# Patient Record
Sex: FEMALE | Race: WHITE | NOT HISPANIC OR LATINO | Employment: FULL TIME | URBAN - NONMETROPOLITAN AREA
[De-identification: names, ages, dates, MRNs, and addresses within clinical notes are randomized per-mention and may not be internally consistent; named-entity substitution may affect disease eponyms.]

---

## 2018-12-12 ENCOUNTER — HOSPITAL ENCOUNTER (OUTPATIENT)
Dept: PHYSICAL THERAPY | Facility: HOSPITAL | Age: 28
Discharge: HOME | End: 2018-12-12

## 2018-12-12 PROCEDURE — 97799 UNLISTED PHYSCL MED/REHAB PX: CPT

## 2019-01-18 ENCOUNTER — TRANSCRIBE ORDERS (OUTPATIENT)
Dept: ADMINISTRATIVE | Facility: HOSPITAL | Age: 29
End: 2019-01-18

## 2019-01-18 DIAGNOSIS — R10.11 ABDOMINAL PAIN, RIGHT UPPER QUADRANT: Primary | ICD-10-CM

## 2019-01-22 ENCOUNTER — TRANSCRIBE ORDERS (OUTPATIENT)
Dept: LAB | Facility: HOSPITAL | Age: 29
End: 2019-01-22

## 2019-01-22 ENCOUNTER — HOSPITAL ENCOUNTER (OUTPATIENT)
Dept: ULTRASOUND IMAGING | Facility: HOSPITAL | Age: 29
Discharge: HOME | End: 2019-01-22
Admitting: NURSE PRACTITIONER

## 2019-01-22 DIAGNOSIS — R19.01 RIGHT UPPER QUADRANT ABDOMINAL MASS: ICD-10-CM

## 2019-01-22 DIAGNOSIS — R19.01 RIGHT UPPER QUADRANT ABDOMINAL MASS: Primary | ICD-10-CM

## 2019-01-22 PROCEDURE — 76705 ECHO EXAM OF ABDOMEN: CPT

## 2019-01-28 ENCOUNTER — HOSPITAL ENCOUNTER (OUTPATIENT)
Dept: NUCLEAR MEDICINE | Facility: HOSPITAL | Age: 29
End: 2019-01-28

## 2019-02-05 ENCOUNTER — TRANSCRIBE ORDERS (OUTPATIENT)
Dept: ADMINISTRATIVE | Facility: HOSPITAL | Age: 29
End: 2019-02-05

## 2019-02-05 DIAGNOSIS — R10.11 RUQ ABDOMINAL PAIN: Primary | ICD-10-CM

## 2019-02-07 ENCOUNTER — HOSPITAL ENCOUNTER (OUTPATIENT)
Dept: MRI IMAGING | Facility: HOSPITAL | Age: 29
Discharge: HOME | End: 2019-02-07
Admitting: RADIOLOGY

## 2019-02-07 ENCOUNTER — APPOINTMENT (OUTPATIENT)
Dept: LAB | Facility: HOSPITAL | Age: 29
End: 2019-02-07

## 2019-02-07 DIAGNOSIS — R10.11 RUQ ABDOMINAL PAIN: ICD-10-CM

## 2019-02-07 LAB
CREAT BLDA-MCNC: 0.6 MG/DL (ref 0.6–1.3)
HCG INTACT+B SERPL-ACNC: <2.39 MIU/ML (ref 0–5)

## 2019-02-07 PROCEDURE — 84702 CHORIONIC GONADOTROPIN TEST: CPT | Performed by: RADIOLOGY

## 2019-02-07 PROCEDURE — 74183 MRI ABD W/O CNTR FLWD CNTR: CPT

## 2019-02-07 PROCEDURE — 82565 ASSAY OF CREATININE: CPT

## 2019-02-07 PROCEDURE — A9577 INJ MULTIHANCE: HCPCS | Performed by: NURSE PRACTITIONER

## 2019-02-07 PROCEDURE — 0 GADOBENATE DIMEGLUMINE 529 MG/ML SOLUTION: Performed by: NURSE PRACTITIONER

## 2019-02-07 RX ADMIN — GADOBENATE DIMEGLUMINE 14 ML: 529 INJECTION, SOLUTION INTRAVENOUS at 08:50

## 2019-02-19 ENCOUNTER — APPOINTMENT (OUTPATIENT)
Dept: NUCLEAR MEDICINE | Facility: HOSPITAL | Age: 29
End: 2019-02-19

## 2020-05-14 ENCOUNTER — OFFICE VISIT (OUTPATIENT)
Dept: OBSTETRICS AND GYNECOLOGY | Facility: CLINIC | Age: 30
End: 2020-05-14

## 2020-05-14 VITALS
HEIGHT: 63 IN | BODY MASS INDEX: 25.16 KG/M2 | DIASTOLIC BLOOD PRESSURE: 78 MMHG | WEIGHT: 142 LBS | SYSTOLIC BLOOD PRESSURE: 100 MMHG

## 2020-05-14 DIAGNOSIS — Z12.4 SCREENING FOR CERVICAL CANCER: ICD-10-CM

## 2020-05-14 DIAGNOSIS — Z01.419 ENCOUNTER FOR GYNECOLOGICAL EXAMINATION WITHOUT ABNORMAL FINDING: Primary | ICD-10-CM

## 2020-05-14 PROCEDURE — 99385 PREV VISIT NEW AGE 18-39: CPT | Performed by: OBSTETRICS & GYNECOLOGY

## 2020-05-14 PROCEDURE — 87624 HPV HI-RISK TYP POOLED RSLT: CPT | Performed by: OBSTETRICS & GYNECOLOGY

## 2020-05-14 RX ORDER — PRENATAL VIT NO.126/IRON/FOLIC 28MG-0.8MG
TABLET ORAL DAILY
COMMUNITY
End: 2022-02-08

## 2020-05-14 RX ORDER — PROPRANOLOL HYDROCHLORIDE 10 MG/1
10 TABLET ORAL AS NEEDED
COMMUNITY
End: 2023-02-03

## 2020-05-14 NOTE — PROGRESS NOTES
"CC: annual exam    SUBJECTIVE: Dania Cardoso is a 29 y.o. female , para 1, who comes to the office today for annual GYN examination. Last menstrual period was 2 weeks ago and her last Pap smear was 3 years ago, and was normal. She has no history of cervical dysplasia. She is currently not on contraception and is hoping to get pregnant. She is not taking a prenatal vitamin. Her medical history is reviewed. Her partner is currently on chemotherapy for a brain tumor, but is hoping to finish soon.    HPI      Social History     Tobacco Use   • Smoking status: Never Smoker   • Smokeless tobacco: Never Used   Substance Use Topics   • Alcohol use: Never     Frequency: Never   • Drug use: Never        Review of Systems   Constitutional: Negative for fever.   Respiratory: Negative for cough.    Gastrointestinal: Negative for abdominal distention.   Genitourinary: Negative for dyspareunia.   Hematological: Does not bruise/bleed easily.       Visit Vitals  /78 (BP Location: Left arm, Patient Position: Sitting)   Ht 160 cm (63\")   Wt 64.4 kg (142 lb)   LMP 05/01/2020 (Approximate)   BMI 25.15 kg/m²      Objective   Physical Exam   Constitutional: She is oriented to person, place, and time. She appears well-developed and well-nourished. No distress.   HENT:   Head: Normocephalic and atraumatic.   Eyes: EOM are normal.   Neck: Normal range of motion. Neck supple.   Cardiovascular: Normal rate and regular rhythm.   No murmur heard.  Pulmonary/Chest: Effort normal and breath sounds normal. Right breast exhibits no inverted nipple and no mass. Left breast exhibits no inverted nipple and no mass. No breast tenderness or discharge.   Abdominal: Soft. She exhibits no distension. There is no tenderness.   Genitourinary: Vagina normal and uterus normal. No breast tenderness or discharge. Pelvic exam was performed with patient supine. There is no tenderness or lesion on the right labia. There is no tenderness or lesion on the left " labia. Cervix exhibits no motion tenderness, no discharge and no friability. Right adnexum displays no tenderness and no fullness. Left adnexum displays no tenderness and no fullness. No tenderness or bleeding in the vagina. No vaginal discharge found.   Genitourinary Comments: A Pap smear was performed   Musculoskeletal: Normal range of motion. She exhibits no edema.   Neurological: She is alert and oriented to person, place, and time.   Skin: Skin is warm and dry.   Psychiatric: She has a normal mood and affect. Her behavior is normal. Judgment normal.   Nursing note and vitals reviewed.    Assessment/Plan   Dania was seen today for infertility and gynecologic exam.    Diagnoses and all orders for this visit:    Encounter for gynecological examination without abnormal finding    Screening for cervical cancer  -     Liquid-based Pap Smear, Screening      She will start a prenatal vitamin, and he will return for a semen analysis once he completes chemotherapy.  We will notify her when the Pap smear results are available. We have discussed current Pap smear screening guidelines.  She will return in one year. In the meantime if she develops questions or problems, she will notify the office.

## 2020-05-18 LAB
GEN CATEG CVX/VAG CYTO-IMP: NORMAL
HPV I/H RISK 4 DNA CVX QL PROBE+SIG AMP: NOT DETECTED
LAB AP CASE REPORT: NORMAL
LAB AP GYN ADDITIONAL INFORMATION: NORMAL
LAB AP GYN OTHER FINDINGS: NORMAL
PATH INTERP SPEC-IMP: NORMAL
STAT OF ADQ CVX/VAG CYTO-IMP: NORMAL

## 2020-09-14 ENCOUNTER — HOSPITAL ENCOUNTER (EMERGENCY)
Facility: HOSPITAL | Age: 30
Discharge: HOME | End: 2020-09-15
Admitting: INTERNAL MEDICINE

## 2020-09-14 DIAGNOSIS — K52.9 GASTROENTERITIS: Primary | ICD-10-CM

## 2020-09-14 LAB
ALBUMIN SERPL-MCNC: 5 G/DL (ref 3.5–5.2)
ALBUMIN/GLOB SERPL: 2.2 G/DL
ALP SERPL-CCNC: 58 U/L (ref 39–117)
ALT SERPL W P-5'-P-CCNC: 14 U/L (ref 1–33)
ANION GAP SERPL CALCULATED.3IONS-SCNC: 12 MMOL/L (ref 5–15)
AST SERPL-CCNC: 16 U/L (ref 1–32)
B-HCG UR QL: NEGATIVE
BACTERIA UR QL AUTO: ABNORMAL /HPF
BASOPHILS # BLD AUTO: 0.09 10*3/MM3 (ref 0–0.2)
BASOPHILS NFR BLD AUTO: 1.1 % (ref 0–1.5)
BILIRUB SERPL-MCNC: 0.3 MG/DL (ref 0–1.2)
BILIRUB UR QL STRIP: NEGATIVE
BUN SERPL-MCNC: 15 MG/DL (ref 6–20)
BUN/CREAT SERPL: 30 (ref 7–25)
CALCIUM SPEC-SCNC: 9.7 MG/DL (ref 8.6–10.5)
CHLORIDE SERPL-SCNC: 104 MMOL/L (ref 98–107)
CLARITY UR: CLEAR
CO2 SERPL-SCNC: 26 MMOL/L (ref 22–29)
COLOR UR: YELLOW
CREAT SERPL-MCNC: 0.5 MG/DL (ref 0.57–1)
DEPRECATED RDW RBC AUTO: 42.3 FL (ref 37–54)
EOSINOPHIL # BLD AUTO: 0.25 10*3/MM3 (ref 0–0.4)
EOSINOPHIL NFR BLD AUTO: 2.9 % (ref 0.3–6.2)
ERYTHROCYTE [DISTWIDTH] IN BLOOD BY AUTOMATED COUNT: 12.6 % (ref 12.3–15.4)
GFR SERPL CREATININE-BSD FRML MDRD: 144 ML/MIN/1.73
GLOBULIN UR ELPH-MCNC: 2.3 GM/DL
GLUCOSE SERPL-MCNC: 95 MG/DL (ref 65–99)
GLUCOSE UR STRIP-MCNC: NEGATIVE MG/DL
HCT VFR BLD AUTO: 40.9 % (ref 34–46.6)
HGB BLD-MCNC: 13.6 G/DL (ref 12–15.9)
HGB UR QL STRIP.AUTO: NEGATIVE
HYALINE CASTS UR QL AUTO: ABNORMAL /LPF
IMM GRANULOCYTES # BLD AUTO: 0.02 10*3/MM3 (ref 0–0.05)
IMM GRANULOCYTES NFR BLD AUTO: 0.2 % (ref 0–0.5)
INTERNAL NEGATIVE CONTROL: NEGATIVE
INTERNAL POSITIVE CONTROL: POSITIVE
KETONES UR QL STRIP: NEGATIVE
LEUKOCYTE ESTERASE UR QL STRIP.AUTO: ABNORMAL
LIPASE SERPL-CCNC: 44 U/L (ref 13–60)
LYMPHOCYTES # BLD AUTO: 3.47 10*3/MM3 (ref 0.7–3.1)
LYMPHOCYTES NFR BLD AUTO: 40.9 % (ref 19.6–45.3)
Lab: NORMAL
MCH RBC QN AUTO: 30.4 PG (ref 26.6–33)
MCHC RBC AUTO-ENTMCNC: 33.3 G/DL (ref 31.5–35.7)
MCV RBC AUTO: 91.5 FL (ref 79–97)
MONOCYTES # BLD AUTO: 0.45 10*3/MM3 (ref 0.1–0.9)
MONOCYTES NFR BLD AUTO: 5.3 % (ref 5–12)
NEUTROPHILS NFR BLD AUTO: 4.21 10*3/MM3 (ref 1.7–7)
NEUTROPHILS NFR BLD AUTO: 49.6 % (ref 42.7–76)
NITRITE UR QL STRIP: NEGATIVE
NRBC BLD AUTO-RTO: 0 /100 WBC (ref 0–0.2)
PH UR STRIP.AUTO: 6.5 [PH] (ref 5–8)
PLATELET # BLD AUTO: 220 10*3/MM3 (ref 140–450)
PMV BLD AUTO: 11.6 FL (ref 6–12)
POTASSIUM SERPL-SCNC: 3.6 MMOL/L (ref 3.5–5.2)
PROT SERPL-MCNC: 7.3 G/DL (ref 6–8.5)
PROT UR QL STRIP: NEGATIVE
RBC # BLD AUTO: 4.47 10*6/MM3 (ref 3.77–5.28)
RBC # UR: ABNORMAL /HPF
REF LAB TEST METHOD: ABNORMAL
SODIUM SERPL-SCNC: 142 MMOL/L (ref 136–145)
SP GR UR STRIP: 1.02 (ref 1–1.03)
SQUAMOUS #/AREA URNS HPF: ABNORMAL /HPF
UROBILINOGEN UR QL STRIP: ABNORMAL
WBC # BLD AUTO: 8.49 10*3/MM3 (ref 3.4–10.8)
WBC UR QL AUTO: ABNORMAL /HPF

## 2020-09-14 PROCEDURE — 84484 ASSAY OF TROPONIN QUANT: CPT | Performed by: NURSE PRACTITIONER

## 2020-09-14 PROCEDURE — 80053 COMPREHEN METABOLIC PANEL: CPT

## 2020-09-14 PROCEDURE — 85025 COMPLETE CBC W/AUTO DIFF WBC: CPT

## 2020-09-14 PROCEDURE — 81025 URINE PREGNANCY TEST: CPT

## 2020-09-14 PROCEDURE — 83690 ASSAY OF LIPASE: CPT

## 2020-09-14 PROCEDURE — 85379 FIBRIN DEGRADATION QUANT: CPT | Performed by: NURSE PRACTITIONER

## 2020-09-14 PROCEDURE — 99283 EMERGENCY DEPT VISIT LOW MDM: CPT

## 2020-09-14 PROCEDURE — 81001 URINALYSIS AUTO W/SCOPE: CPT

## 2020-09-14 RX ORDER — SODIUM CHLORIDE 0.9 % (FLUSH) 0.9 %
10 SYRINGE (ML) INJECTION AS NEEDED
Status: DISCONTINUED | OUTPATIENT
Start: 2020-09-14 | End: 2020-09-15 | Stop reason: HOSPADM

## 2020-09-15 ENCOUNTER — APPOINTMENT (OUTPATIENT)
Dept: CT IMAGING | Facility: HOSPITAL | Age: 30
End: 2020-09-15

## 2020-09-15 VITALS
OXYGEN SATURATION: 99 % | HEART RATE: 61 BPM | BODY MASS INDEX: 26.22 KG/M2 | RESPIRATION RATE: 18 BRPM | DIASTOLIC BLOOD PRESSURE: 73 MMHG | TEMPERATURE: 98.2 F | SYSTOLIC BLOOD PRESSURE: 121 MMHG | HEIGHT: 63 IN | WEIGHT: 148 LBS

## 2020-09-15 LAB
D DIMER PPP FEU-MCNC: 0.24 MG/L (FEU) (ref 0–0.5)
HOLD SPECIMEN: NORMAL
HOLD SPECIMEN: NORMAL
TROPONIN T SERPL-MCNC: <0.01 NG/ML (ref 0–0.03)
WHOLE BLOOD HOLD SPECIMEN: NORMAL
WHOLE BLOOD HOLD SPECIMEN: NORMAL

## 2020-09-15 PROCEDURE — 25010000002 IOPAMIDOL 61 % SOLUTION: Performed by: NURSE PRACTITIONER

## 2020-09-15 PROCEDURE — 74177 CT ABD & PELVIS W/CONTRAST: CPT

## 2020-09-15 PROCEDURE — 93005 ELECTROCARDIOGRAM TRACING: CPT | Performed by: NURSE PRACTITIONER

## 2020-09-15 PROCEDURE — 93010 ELECTROCARDIOGRAM REPORT: CPT | Performed by: INTERNAL MEDICINE

## 2020-09-15 RX ORDER — ONDANSETRON 4 MG/1
4 TABLET, ORALLY DISINTEGRATING ORAL EVERY 6 HOURS PRN
Qty: 8 TABLET | Refills: 0 | Status: SHIPPED | OUTPATIENT
Start: 2020-09-15 | End: 2020-09-17

## 2020-09-15 RX ADMIN — IOPAMIDOL 100 ML: 612 INJECTION, SOLUTION INTRAVENOUS at 01:30

## 2020-09-15 NOTE — ED PROVIDER NOTES
Subjective   Patient is a 30-year-old female that presents here today with complaint of chest and right upper quadrant abdominal pain.  The patient reports that her symptoms began earlier this evening after eating supper.  The patient states that she began to have right upper quadrant pain that radiated to her right upper chest.  She states it was hard for her to take a deep breath with this occurred.  She states that the pain has been intermittent since that time.  She denies any pain at this time.  She is concerned that it could be her gallbladder.  The patient presents here today for further evaluation.  States that she was nauseated with the pain earlier however this is resolved.      History provided by:  Patient   used: No    Abdominal Pain  Pain location:  RUQ  Pain quality: sharp and stabbing    Pain radiates to:  Chest  Pain severity:  Moderate  Onset quality:  Sudden  Duration:  1 day  Timing:  Constant  Progression:  Worsening  Chronicity:  New  Context: not alcohol use, not awakening from sleep, not diet changes, not eating, not laxative use, not medication withdrawal, not previous surgeries, not recent illness, not recent sexual activity, not recent travel, not retching, not sick contacts, not suspicious food intake and not trauma    Relieved by:  Nothing  Worsened by:  Nothing  Ineffective treatments:  None tried  Associated symptoms: chest pain and nausea    Associated symptoms: no anorexia, no belching, no chills, no constipation, no cough, no diarrhea, no dysuria, no fatigue, no fever, no flatus, no hematemesis, no hematochezia, no hematuria, no melena, no shortness of breath, no sore throat, no vaginal bleeding, no vaginal discharge and no vomiting    Risk factors: no alcohol abuse, no aspirin use, not elderly, has not had multiple surgeries, no NSAID use, not obese, not pregnant and no recent hospitalization        Review of Systems   Constitutional: Negative for chills,  fatigue and fever.   HENT: Negative for sore throat.    Respiratory: Negative for cough and shortness of breath.    Cardiovascular: Positive for chest pain.   Gastrointestinal: Positive for abdominal pain and nausea. Negative for anorexia, constipation, diarrhea, flatus, hematemesis, hematochezia, melena and vomiting.   Genitourinary: Negative for dysuria, hematuria, vaginal bleeding and vaginal discharge.   All other systems reviewed and are negative.      Past Medical History:   Diagnosis Date   • Anxiety        Allergies   Allergen Reactions   • Phenergan [Promethazine Hcl] Nausea And Vomiting   • Wasp Venom Swelling and Other (See Comments)     migraines       Past Surgical History:   Procedure Laterality Date   • WISDOM TOOTH EXTRACTION         History reviewed. No pertinent family history.    Social History     Socioeconomic History   • Marital status:      Spouse name: Not on file   • Number of children: Not on file   • Years of education: Not on file   • Highest education level: Not on file   Tobacco Use   • Smoking status: Never Smoker   • Smokeless tobacco: Never Used   Substance and Sexual Activity   • Alcohol use: Never     Frequency: Never   • Drug use: Never   • Sexual activity: Yes     Partners: Male     Birth control/protection: None           Objective   Physical Exam  Vitals signs and nursing note reviewed.   Constitutional:       Appearance: Normal appearance.   HENT:      Head: Normocephalic and atraumatic.   Eyes:      Conjunctiva/sclera: Conjunctivae normal.   Cardiovascular:      Rate and Rhythm: Normal rate and regular rhythm.   Pulmonary:      Effort: Pulmonary effort is normal.      Breath sounds: Normal breath sounds.   Abdominal:      General: Bowel sounds are normal.      Tenderness: There is abdominal tenderness in the right lower quadrant. There is right CVA tenderness.   Skin:     General: Skin is warm and dry.      Capillary Refill: Capillary refill takes less than 2 seconds.    Neurological:      General: No focal deficit present.      Mental Status: She is alert.   Psychiatric:         Mood and Affect: Mood normal.         Procedures           ED Course  ED Course as of Sep 19 0018   Sat Sep 19, 2020   0017 Patient's labs showed a negative troponin.  Lipase was normal.  White blood cell count was normal.  EG showed no acute findings.  CT scan of the abdomen and pelvis showed no acute findings.  At this time patient be discharged home in stable condition.  She advised to follow the primary care provider 1 to 2 days for recheck.    [LF]   0017 She will given a prescription for Zofran.  Advised her to discuss possibly having an outpatient gallbladder ultrasound ordered for further evaluation of her symptoms.  She be discharged home at this time stable condition.    [LF]      ED Course User Index  [LF] Pat Rosales, APRN                                   CT Abdomen Pelvis With Contrast   Final Result   1. No acute inflammatory process seen within the abdomen or pelvis.   2. Similar hypodense liver lesions favorable for cysts and/or   hemangiomas.   3. Hypodense endometrium of the uterus likely related to secretory phase   of menstruation. Follicles suspected within the ovaries.       Comments: A preliminary report is issued to the ER by the [facility]   radiology service. I agree with this preliminary impression.   This report was finalized on 09/15/2020 07:49 by Dr. Emilie Barahona MD.        Labs Reviewed   COMPREHENSIVE METABOLIC PANEL - Abnormal; Notable for the following components:       Result Value    Creatinine 0.50 (*)     BUN/Creatinine Ratio 30.0 (*)     All other components within normal limits    Narrative:     GFR Normal >60  Chronic Kidney Disease <60  Kidney Failure <15     URINALYSIS W/ MICROSCOPIC IF INDICATED (NO CULTURE) - Abnormal; Notable for the following components:    Leuk Esterase, UA Trace (*)     All other components within normal limits   CBC WITH AUTO  DIFFERENTIAL - Abnormal; Notable for the following components:    Lymphocytes, Absolute 3.47 (*)     All other components within normal limits   URINALYSIS, MICROSCOPIC ONLY - Abnormal; Notable for the following components:    RBC, UA 3-5 (*)     WBC, UA 3-5 (*)     Bacteria, UA 1+ (*)     Squamous Epithelial Cells, UA 3-6 (*)     All other components within normal limits   LIPASE - Normal   TROPONIN (IN-HOUSE) - Normal    Narrative:     Troponin T Reference Range:  <= 0.03 ng/mL-   Negative for AMI  >0.03 ng/mL-     Abnormal for myocardial necrosis.  Clinicians would have to utilize clinical acumen, EKG, Troponin and serial changes to determine if it is an Acute Myocardial Infarction or myocardial injury due to an underlying chronic condition.       Results may be falsely decreased if patient taking Biotin.     D-DIMER, QUANTITATIVE - Normal    Narrative:     Reference Range is 0-0.50 mg/L FEU. However, results <0.50 mg/L FEU tends to rule out DVT or PE. Results >0.50 mg/L FEU are not useful in predicting absence or presence of DVT or PE.     POCT PEFORM URINE PREGNANCY - Normal   RAINBOW DRAW    Narrative:     The following orders were created for panel order [facility] Draw.  Procedure                               Abnormality         Status                     ---------                               -----------         ------                     Light Blue Top[[id number]]                                   Final result               Green Top (Gel)[[id number]]                                  Final result               Lavender Top[[id number]]                                     Final result               Red Top[[id number]]                                          Final result                 Please view results for these tests on the individual orders.   CBC AND DIFFERENTIAL    Narrative:     The following orders were created for panel order CBC & Differential.  Procedure                               Abnormality          Status                     ---------                               -----------         ------                     CBC Auto Differential[[id number]]        Abnormal            Final result                 Please view results for these tests on the individual orders.   LIGHT BLUE TOP   GREEN TOP   LAVENDER TOP   RED TOP               MDM  Number of Diagnoses or Management Options  Gastroenteritis: new and requires workup     Amount and/or Complexity of Data Reviewed  Clinical lab tests: reviewed and ordered  Tests in the radiology section of CPT®: ordered and reviewed  Discuss the patient with other providers: yes (Dr. Suresh)    Patient Progress  Patient progress: stable      Final diagnoses:   Gastroenteritis            Pat Rosales, APRN  09/19/20 0018

## 2020-09-21 ENCOUNTER — TRANSCRIBE ORDERS (OUTPATIENT)
Dept: ADMINISTRATIVE | Facility: HOSPITAL | Age: 30
End: 2020-09-21

## 2020-09-21 ENCOUNTER — HOSPITAL ENCOUNTER (OUTPATIENT)
Dept: ULTRASOUND IMAGING | Facility: HOSPITAL | Age: 30
Discharge: HOME | End: 2020-09-21
Admitting: NURSE PRACTITIONER

## 2020-09-21 DIAGNOSIS — R11.0 NAUSEA: ICD-10-CM

## 2020-09-21 DIAGNOSIS — R10.11 RIGHT UPPER QUADRANT ABDOMINAL PAIN: Primary | ICD-10-CM

## 2020-09-21 PROCEDURE — 76705 ECHO EXAM OF ABDOMEN: CPT

## 2020-09-30 ENCOUNTER — APPOINTMENT (OUTPATIENT)
Dept: PREADMISSION TESTING | Facility: HOSPITAL | Age: 30
End: 2020-09-30

## 2020-09-30 ENCOUNTER — LAB (OUTPATIENT)
Dept: LAB | Facility: HOSPITAL | Age: 30
End: 2020-09-30

## 2020-09-30 ENCOUNTER — TRANSCRIBE ORDERS (OUTPATIENT)
Dept: ADMINISTRATIVE | Facility: HOSPITAL | Age: 30
End: 2020-09-30

## 2020-09-30 VITALS
SYSTOLIC BLOOD PRESSURE: 119 MMHG | HEIGHT: 64 IN | BODY MASS INDEX: 24.84 KG/M2 | DIASTOLIC BLOOD PRESSURE: 79 MMHG | HEART RATE: 77 BPM | OXYGEN SATURATION: 99 % | RESPIRATION RATE: 16 BRPM | WEIGHT: 145.5 LBS

## 2020-09-30 DIAGNOSIS — Z01.818 PREOP TESTING: Primary | ICD-10-CM

## 2020-09-30 PROCEDURE — C9803 HOPD COVID-19 SPEC COLLECT: HCPCS | Performed by: SPECIALIST

## 2020-09-30 PROCEDURE — 87635 SARS-COV-2 COVID-19 AMP PRB: CPT | Performed by: SPECIALIST

## 2020-09-30 RX ORDER — DOCUSATE SODIUM 100 MG/1
100 CAPSULE, LIQUID FILLED ORAL 2 TIMES DAILY
COMMUNITY
End: 2023-02-03

## 2020-10-01 ENCOUNTER — ANESTHESIA EVENT (OUTPATIENT)
Dept: PERIOP | Facility: HOSPITAL | Age: 30
End: 2020-10-01

## 2020-10-01 ENCOUNTER — ANESTHESIA (OUTPATIENT)
Dept: PERIOP | Facility: HOSPITAL | Age: 30
End: 2020-10-01

## 2020-10-01 ENCOUNTER — HOSPITAL ENCOUNTER (OUTPATIENT)
Facility: HOSPITAL | Age: 30
Setting detail: HOSPITAL OUTPATIENT SURGERY
Discharge: HOME | End: 2020-10-01
Attending: SPECIALIST | Admitting: SPECIALIST

## 2020-10-01 VITALS
HEART RATE: 66 BPM | RESPIRATION RATE: 14 BRPM | SYSTOLIC BLOOD PRESSURE: 115 MMHG | DIASTOLIC BLOOD PRESSURE: 75 MMHG | OXYGEN SATURATION: 97 % | TEMPERATURE: 97.3 F

## 2020-10-01 DIAGNOSIS — K82.8 OTHER SPECIFIED DISEASES OF GALLBLADDER: ICD-10-CM

## 2020-10-01 LAB
B-HCG UR QL: NEGATIVE
SARS-COV-2 N GENE RESP QL NAA+PROBE: NOT DETECTED

## 2020-10-01 PROCEDURE — 25010000002 ONDANSETRON PER 1 MG: Performed by: ANESTHESIOLOGY

## 2020-10-01 PROCEDURE — 25010000002 ONDANSETRON PER 1 MG: Performed by: NURSE ANESTHETIST, CERTIFIED REGISTERED

## 2020-10-01 PROCEDURE — 25010000002 DEXAMETHASONE PER 1 MG: Performed by: ANESTHESIOLOGY

## 2020-10-01 PROCEDURE — 25010000002 PROPOFOL 10 MG/ML EMULSION: Performed by: NURSE ANESTHETIST, CERTIFIED REGISTERED

## 2020-10-01 PROCEDURE — 25010000002 MIDAZOLAM PER 1 MG: Performed by: ANESTHESIOLOGY

## 2020-10-01 PROCEDURE — 88304 TISSUE EXAM BY PATHOLOGIST: CPT | Performed by: SPECIALIST

## 2020-10-01 PROCEDURE — 81025 URINE PREGNANCY TEST: CPT | Performed by: SPECIALIST

## 2020-10-01 RX ORDER — IBUPROFEN 600 MG/1
600 TABLET ORAL ONCE AS NEEDED
Status: COMPLETED | OUTPATIENT
Start: 2020-10-01 | End: 2020-10-01

## 2020-10-01 RX ORDER — SODIUM CHLORIDE 9 MG/ML
INJECTION, SOLUTION INTRAVENOUS AS NEEDED
Status: DISCONTINUED | OUTPATIENT
Start: 2020-10-01 | End: 2020-10-01 | Stop reason: HOSPADM

## 2020-10-01 RX ORDER — ONDANSETRON 2 MG/ML
4 INJECTION INTRAMUSCULAR; INTRAVENOUS EVERY 6 HOURS PRN
Status: DISCONTINUED | OUTPATIENT
Start: 2020-10-01 | End: 2020-10-01 | Stop reason: HOSPADM

## 2020-10-01 RX ORDER — NALOXONE HCL 0.4 MG/ML
0.4 VIAL (ML) INJECTION AS NEEDED
Status: DISCONTINUED | OUTPATIENT
Start: 2020-10-01 | End: 2020-10-01 | Stop reason: HOSPADM

## 2020-10-01 RX ORDER — OXYCODONE AND ACETAMINOPHEN 10; 325 MG/1; MG/1
1 TABLET ORAL ONCE AS NEEDED
Status: COMPLETED | OUTPATIENT
Start: 2020-10-01 | End: 2020-10-01

## 2020-10-01 RX ORDER — SODIUM CHLORIDE, SODIUM LACTATE, POTASSIUM CHLORIDE, CALCIUM CHLORIDE 600; 310; 30; 20 MG/100ML; MG/100ML; MG/100ML; MG/100ML
9 INJECTION, SOLUTION INTRAVENOUS CONTINUOUS
Status: DISCONTINUED | OUTPATIENT
Start: 2020-10-01 | End: 2020-10-01 | Stop reason: HOSPADM

## 2020-10-01 RX ORDER — SCOLOPAMINE TRANSDERMAL SYSTEM 1 MG/1
1 PATCH, EXTENDED RELEASE TRANSDERMAL CONTINUOUS
Status: DISCONTINUED | OUTPATIENT
Start: 2020-10-01 | End: 2020-10-01 | Stop reason: HOSPADM

## 2020-10-01 RX ORDER — PROPOFOL 10 MG/ML
VIAL (ML) INTRAVENOUS AS NEEDED
Status: DISCONTINUED | OUTPATIENT
Start: 2020-10-01 | End: 2020-10-01 | Stop reason: SURG

## 2020-10-01 RX ORDER — SODIUM CHLORIDE 0.9 % (FLUSH) 0.9 %
3 SYRINGE (ML) INJECTION AS NEEDED
Status: DISCONTINUED | OUTPATIENT
Start: 2020-10-01 | End: 2020-10-01 | Stop reason: HOSPADM

## 2020-10-01 RX ORDER — FENTANYL CITRATE 50 UG/ML
25 INJECTION, SOLUTION INTRAMUSCULAR; INTRAVENOUS
Status: DISCONTINUED | OUTPATIENT
Start: 2020-10-01 | End: 2020-10-01 | Stop reason: HOSPADM

## 2020-10-01 RX ORDER — ONDANSETRON 2 MG/ML
4 INJECTION INTRAMUSCULAR; INTRAVENOUS ONCE AS NEEDED
Status: COMPLETED | OUTPATIENT
Start: 2020-10-01 | End: 2020-10-01

## 2020-10-01 RX ORDER — SODIUM CHLORIDE 0.9 % (FLUSH) 0.9 %
10 SYRINGE (ML) INJECTION EVERY 12 HOURS SCHEDULED
Status: DISCONTINUED | OUTPATIENT
Start: 2020-10-01 | End: 2020-10-01 | Stop reason: HOSPADM

## 2020-10-01 RX ORDER — SODIUM CHLORIDE 0.9 % (FLUSH) 0.9 %
10 SYRINGE (ML) INJECTION AS NEEDED
Status: DISCONTINUED | OUTPATIENT
Start: 2020-10-01 | End: 2020-10-01 | Stop reason: HOSPADM

## 2020-10-01 RX ORDER — DEXAMETHASONE SODIUM PHOSPHATE 4 MG/ML
4 INJECTION, SOLUTION INTRA-ARTICULAR; INTRALESIONAL; INTRAMUSCULAR; INTRAVENOUS; SOFT TISSUE ONCE AS NEEDED
Status: COMPLETED | OUTPATIENT
Start: 2020-10-01 | End: 2020-10-01

## 2020-10-01 RX ORDER — OXYCODONE HYDROCHLORIDE AND ACETAMINOPHEN 5; 325 MG/1; MG/1
1 TABLET ORAL ONCE AS NEEDED
Status: DISCONTINUED | OUTPATIENT
Start: 2020-10-01 | End: 2020-10-01 | Stop reason: HOSPADM

## 2020-10-01 RX ORDER — MAGNESIUM HYDROXIDE 1200 MG/15ML
LIQUID ORAL AS NEEDED
Status: DISCONTINUED | OUTPATIENT
Start: 2020-10-01 | End: 2020-10-01 | Stop reason: HOSPADM

## 2020-10-01 RX ORDER — DEXTROSE MONOHYDRATE 25 G/50ML
12.5 INJECTION, SOLUTION INTRAVENOUS AS NEEDED
Status: DISCONTINUED | OUTPATIENT
Start: 2020-10-01 | End: 2020-10-01 | Stop reason: HOSPADM

## 2020-10-01 RX ORDER — OXYCODONE HYDROCHLORIDE AND ACETAMINOPHEN 5; 325 MG/1; MG/1
1-2 TABLET ORAL EVERY 4 HOURS PRN
Qty: 30 TABLET | Refills: 0 | Status: SHIPPED | OUTPATIENT
Start: 2020-10-01 | End: 2022-02-08

## 2020-10-01 RX ORDER — ROCURONIUM BROMIDE 10 MG/ML
INJECTION, SOLUTION INTRAVENOUS AS NEEDED
Status: DISCONTINUED | OUTPATIENT
Start: 2020-10-01 | End: 2020-10-01 | Stop reason: SURG

## 2020-10-01 RX ORDER — SODIUM CHLORIDE, SODIUM LACTATE, POTASSIUM CHLORIDE, CALCIUM CHLORIDE 600; 310; 30; 20 MG/100ML; MG/100ML; MG/100ML; MG/100ML
1000 INJECTION, SOLUTION INTRAVENOUS CONTINUOUS
Status: DISCONTINUED | OUTPATIENT
Start: 2020-10-01 | End: 2020-10-01 | Stop reason: HOSPADM

## 2020-10-01 RX ORDER — LABETALOL HYDROCHLORIDE 5 MG/ML
5 INJECTION, SOLUTION INTRAVENOUS
Status: DISCONTINUED | OUTPATIENT
Start: 2020-10-01 | End: 2020-10-01 | Stop reason: HOSPADM

## 2020-10-01 RX ORDER — SUFENTANIL CITRATE 50 UG/ML
INJECTION EPIDURAL; INTRAVENOUS AS NEEDED
Status: DISCONTINUED | OUTPATIENT
Start: 2020-10-01 | End: 2020-10-01 | Stop reason: SURG

## 2020-10-01 RX ORDER — MIDAZOLAM HYDROCHLORIDE 1 MG/ML
1 INJECTION INTRAMUSCULAR; INTRAVENOUS
Status: COMPLETED | OUTPATIENT
Start: 2020-10-01 | End: 2020-10-01

## 2020-10-01 RX ORDER — LIDOCAINE HYDROCHLORIDE 40 MG/ML
SOLUTION TOPICAL AS NEEDED
Status: DISCONTINUED | OUTPATIENT
Start: 2020-10-01 | End: 2020-10-01 | Stop reason: SURG

## 2020-10-01 RX ORDER — OXYCODONE AND ACETAMINOPHEN 7.5; 325 MG/1; MG/1
2 TABLET ORAL EVERY 4 HOURS PRN
Status: DISCONTINUED | OUTPATIENT
Start: 2020-10-01 | End: 2020-10-01 | Stop reason: HOSPADM

## 2020-10-01 RX ORDER — BUPIVACAINE HYDROCHLORIDE AND EPINEPHRINE 2.5; 5 MG/ML; UG/ML
INJECTION, SOLUTION INFILTRATION; PERINEURAL AS NEEDED
Status: DISCONTINUED | OUTPATIENT
Start: 2020-10-01 | End: 2020-10-01 | Stop reason: HOSPADM

## 2020-10-01 RX ORDER — LIDOCAINE HYDROCHLORIDE 10 MG/ML
0.5 INJECTION, SOLUTION EPIDURAL; INFILTRATION; INTRACAUDAL; PERINEURAL ONCE AS NEEDED
Status: DISCONTINUED | OUTPATIENT
Start: 2020-10-01 | End: 2020-10-01 | Stop reason: HOSPADM

## 2020-10-01 RX ORDER — FLUMAZENIL 0.1 MG/ML
0.2 INJECTION INTRAVENOUS AS NEEDED
Status: DISCONTINUED | OUTPATIENT
Start: 2020-10-01 | End: 2020-10-01 | Stop reason: HOSPADM

## 2020-10-01 RX ADMIN — SODIUM CHLORIDE, POTASSIUM CHLORIDE, SODIUM LACTATE AND CALCIUM CHLORIDE 9 ML/HR: 600; 310; 30; 20 INJECTION, SOLUTION INTRAVENOUS at 11:12

## 2020-10-01 RX ADMIN — ONDANSETRON HYDROCHLORIDE 4 MG: 2 SOLUTION INTRAMUSCULAR; INTRAVENOUS at 13:23

## 2020-10-01 RX ADMIN — LIDOCAINE HYDROCHLORIDE 1 EACH: 40 SOLUTION TOPICAL at 09:34

## 2020-10-01 RX ADMIN — DEXAMETHASONE SODIUM PHOSPHATE 4 MG: 4 INJECTION, SOLUTION INTRAMUSCULAR; INTRAVENOUS at 08:42

## 2020-10-01 RX ADMIN — OXYCODONE HYDROCHLORIDE AND ACETAMINOPHEN 1 TABLET: 10; 325 TABLET ORAL at 13:23

## 2020-10-01 RX ADMIN — MIDAZOLAM HYDROCHLORIDE 1 MG: 2 INJECTION, SOLUTION INTRAMUSCULAR; INTRAVENOUS at 08:56

## 2020-10-01 RX ADMIN — ROCURONIUM BROMIDE 30 MG: 10 INJECTION INTRAVENOUS at 09:34

## 2020-10-01 RX ADMIN — CEFAZOLIN 1 G: 1 INJECTION, POWDER, FOR SOLUTION INTRAMUSCULAR; INTRAVENOUS; PARENTERAL at 09:41

## 2020-10-01 RX ADMIN — ONDANSETRON HYDROCHLORIDE 4 MG: 2 SOLUTION INTRAMUSCULAR; INTRAVENOUS at 10:40

## 2020-10-01 RX ADMIN — SUFENTANIL CITRATE 50 MCG: 50 INJECTION EPIDURAL; INTRAVENOUS at 09:33

## 2020-10-01 RX ADMIN — SCOPALAMINE 1 PATCH: 1 PATCH, EXTENDED RELEASE TRANSDERMAL at 08:41

## 2020-10-01 RX ADMIN — IBUPROFEN 600 MG: 600 TABLET, FILM COATED ORAL at 11:33

## 2020-10-01 RX ADMIN — LIDOCAINE HYDROCHLORIDE 100 MG: 20 INJECTION, SOLUTION INTRAVENOUS at 09:34

## 2020-10-01 RX ADMIN — MIDAZOLAM HYDROCHLORIDE 1 MG: 2 INJECTION, SOLUTION INTRAMUSCULAR; INTRAVENOUS at 08:42

## 2020-10-01 RX ADMIN — SODIUM CHLORIDE, POTASSIUM CHLORIDE, SODIUM LACTATE AND CALCIUM CHLORIDE 1000 ML: 600; 310; 30; 20 INJECTION, SOLUTION INTRAVENOUS at 07:55

## 2020-10-01 RX ADMIN — PROPOFOL 80 MG: 10 INJECTION, EMULSION INTRAVENOUS at 09:34

## 2020-10-01 NOTE — ANESTHESIA PREPROCEDURE EVALUATION
Anesthesia Evaluation     Patient summary reviewed   no history of anesthetic complications:  NPO Solid Status: > 8 hours             Airway   Mallampati: I  TM distance: >3 FB  Neck ROM: full  Dental      Pulmonary    (-) COPD, asthma, sleep apnea, not a smoker  Cardiovascular   Exercise tolerance: excellent (>7 METS)    (-) pacemaker, past MI, angina, cardiac stents      Neuro/Psych  (+) tremors,     (-) seizures, TIA, CVA  GI/Hepatic/Renal/Endo    (-) GERD, liver disease, no renal disease, diabetes    Musculoskeletal     Abdominal    Substance History      OB/GYN    (-)  Pregnant        Other                        Anesthesia Plan    ASA 1     general     intravenous induction     Anesthetic plan, all risks, benefits, and alternatives have been provided, discussed and informed consent has been obtained with: patient.

## 2020-10-01 NOTE — OP NOTE
Preoperative diagnosis:  Biliary dyskinesia  Postoperative diagnosis:  Same  Procedure; laparoscopic cholecystectomy  Surgeon: Heather Glass MD  Anesthesia:  Get loc  Ebl:  Minimal  Ivf:  See anesthesia  Indications: the patient is a 30-year-old female who presents for cholecystectomy.  The risks, benefits, complications, and possible alternatives were discussed with the patient who agreed to proceed  Description of procedure: the patient was laid supine. The abdomen was prepped and draped. The abdomen was entered with veress.  Trocars were placed. The fundus and infundibulum were grasped and elevated. The cystic duct and artery were skeletonized and identitied. The cystic duct and artery clipped proximally and distally and then transected. The gallbladder was freed from the liver bed with bovie. It was placed in an endocatch bag and removed.  The fascia of the epigastric site was closed with 0 vicyrl with endostitch passer. The skin was closed with 3-0 and 4-0 vicyrl. The sponge, needle, and instrument counts were correct.   complications ;none  Disposition good to pacu  Findings:  Mildly inflamed

## 2020-10-01 NOTE — ANESTHESIA POSTPROCEDURE EVALUATION
Patient: Dania Cardoso    Procedure Summary     Date: 10/01/20 Room / Location:  [facility] OR 06 /  [facility]    Anesthesia Start: 0931 Anesthesia Stop: 1015    Procedure: LAPAROSCOPIC CHOLECYSTECTOMY (N/A Abdomen) Diagnosis: (GALLSTONE)    Surgeon: Heather Glass MD Provider: Efren Irvin CRNA    Anesthesia Type: general ASA Status: 1          Anesthesia Type: general    Vitals  Vitals Value Taken Time   /75 10/01/20 1111   Temp 97.3 °F (36.3 °C) 10/01/20 1110   Pulse 84 10/01/20 1122   Resp 14 10/01/20 1110   SpO2 100 % 10/01/20 1121   Vitals shown include unvalidated device data.        Post Anesthesia Care and Evaluation    PONV Status: none  Comments: Patient d/c from PACU prior to anes eval based on Onesimo score.  Please see RN notes for details of d/c criteria.    Blood pressure 107/72, pulse 61, temperature 97.3 °F (36.3 °C), temperature source Temporal, resp. rate 14, last menstrual period 09/07/2020, SpO2 99 %, not currently breastfeeding.

## 2020-10-01 NOTE — ANESTHESIA PROCEDURE NOTES
Airway  Urgency: elective    Date/Time: 10/1/2020 9:34 AM  Airway not difficult    General Information and Staff    Patient location during procedure: OR  CRNA: Efren Irvin CRNA    Indications and Patient Condition  Indications for airway management: airway protection    Preoxygenated: yes  Mask difficulty assessment: 1 - vent by mask    Final Airway Details  Final airway type: endotracheal airway      Successful airway: ETT  Cuffed: yes   Successful intubation technique: direct laryngoscopy  Facilitating devices/methods: intubating stylet  Endotracheal tube insertion site: oral  Blade: Medeiros  Blade size: 2  ETT size (mm): 7.5  Cormack-Lehane Classification: grade I - full view of glottis  Placement verified by: chest auscultation and capnometry   Cuff volume (mL): 8  Measured from: teeth  ETT/EBT  to teeth (cm): 21  Number of attempts at approach: 1  Assessment: lips, teeth, and gum same as pre-op and atraumatic intubation

## 2020-10-01 NOTE — DISCHARGE INSTRUCTIONS

## 2020-10-02 LAB
LAB AP CASE REPORT: NORMAL
PATH REPORT.FINAL DX SPEC: NORMAL
PATH REPORT.GROSS SPEC: NORMAL

## 2020-12-05 ENCOUNTER — APPOINTMENT (OUTPATIENT)
Dept: CT IMAGING | Facility: HOSPITAL | Age: 30
End: 2020-12-05

## 2020-12-05 ENCOUNTER — HOSPITAL ENCOUNTER (EMERGENCY)
Facility: HOSPITAL | Age: 30
Discharge: HOME | End: 2020-12-06
Attending: EMERGENCY MEDICINE | Admitting: EMERGENCY MEDICINE

## 2020-12-05 ENCOUNTER — APPOINTMENT (OUTPATIENT)
Dept: GENERAL RADIOLOGY | Facility: HOSPITAL | Age: 30
End: 2020-12-05

## 2020-12-05 DIAGNOSIS — U07.1 COVID-19: Primary | ICD-10-CM

## 2020-12-05 DIAGNOSIS — R07.9 CHEST PAIN, UNSPECIFIED TYPE: ICD-10-CM

## 2020-12-05 LAB
ALBUMIN SERPL-MCNC: 3.7 G/DL (ref 3.5–5.2)
ALBUMIN/GLOB SERPL: 1.3 G/DL
ALP SERPL-CCNC: 75 U/L (ref 39–117)
ALT SERPL W P-5'-P-CCNC: 24 U/L (ref 1–33)
ANION GAP SERPL CALCULATED.3IONS-SCNC: 10 MMOL/L (ref 5–15)
APTT PPP: 37.7 SECONDS (ref 24.1–35)
AST SERPL-CCNC: 20 U/L (ref 1–32)
BACTERIA UR QL AUTO: ABNORMAL /HPF
BASOPHILS # BLD AUTO: 0.03 10*3/MM3 (ref 0–0.2)
BASOPHILS NFR BLD AUTO: 0.2 % (ref 0–1.5)
BILIRUB SERPL-MCNC: 0.4 MG/DL (ref 0–1.2)
BILIRUB UR QL STRIP: ABNORMAL
BUN SERPL-MCNC: 13 MG/DL (ref 6–20)
BUN/CREAT SERPL: 23.6 (ref 7–25)
CALCIUM SPEC-SCNC: 8.8 MG/DL (ref 8.6–10.5)
CHLORIDE SERPL-SCNC: 105 MMOL/L (ref 98–107)
CLARITY UR: ABNORMAL
CO2 SERPL-SCNC: 25 MMOL/L (ref 22–29)
COD CRY URNS QL: ABNORMAL /HPF
COLOR UR: ABNORMAL
CREAT SERPL-MCNC: 0.55 MG/DL (ref 0.57–1)
D DIMER PPP FEU-MCNC: 0.54 MG/L (FEU) (ref 0–0.5)
D-LACTATE SERPL-SCNC: 0.9 MMOL/L (ref 0.5–2)
DEPRECATED RDW RBC AUTO: 41.8 FL (ref 37–54)
EOSINOPHIL # BLD AUTO: 0 10*3/MM3 (ref 0–0.4)
EOSINOPHIL NFR BLD AUTO: 0 % (ref 0.3–6.2)
ERYTHROCYTE [DISTWIDTH] IN BLOOD BY AUTOMATED COUNT: 12.7 % (ref 12.3–15.4)
GFR SERPL CREATININE-BSD FRML MDRD: 129 ML/MIN/1.73
GLOBULIN UR ELPH-MCNC: 2.9 GM/DL
GLUCOSE SERPL-MCNC: 113 MG/DL (ref 65–99)
GLUCOSE UR STRIP-MCNC: NEGATIVE MG/DL
HCG SERPL QL: NEGATIVE
HCT VFR BLD AUTO: 38.6 % (ref 34–46.6)
HGB BLD-MCNC: 12.9 G/DL (ref 12–15.9)
HGB UR QL STRIP.AUTO: ABNORMAL
HYALINE CASTS UR QL AUTO: ABNORMAL /LPF
IMM GRANULOCYTES # BLD AUTO: 0.05 10*3/MM3 (ref 0–0.05)
IMM GRANULOCYTES NFR BLD AUTO: 0.4 % (ref 0–0.5)
INR PPP: 1.11 (ref 0.91–1.09)
KETONES UR QL STRIP: ABNORMAL
LEUKOCYTE ESTERASE UR QL STRIP.AUTO: ABNORMAL
LIPASE SERPL-CCNC: 17 U/L (ref 13–60)
LYMPHOCYTES # BLD AUTO: 0.61 10*3/MM3 (ref 0.7–3.1)
LYMPHOCYTES NFR BLD AUTO: 5 % (ref 19.6–45.3)
MCH RBC QN AUTO: 29.7 PG (ref 26.6–33)
MCHC RBC AUTO-ENTMCNC: 33.4 G/DL (ref 31.5–35.7)
MCV RBC AUTO: 88.7 FL (ref 79–97)
MONOCYTES # BLD AUTO: 0.57 10*3/MM3 (ref 0.1–0.9)
MONOCYTES NFR BLD AUTO: 4.6 % (ref 5–12)
NEUTROPHILS NFR BLD AUTO: 11.06 10*3/MM3 (ref 1.7–7)
NEUTROPHILS NFR BLD AUTO: 89.8 % (ref 42.7–76)
NITRITE UR QL STRIP: NEGATIVE
NRBC BLD AUTO-RTO: 0 /100 WBC (ref 0–0.2)
PH UR STRIP.AUTO: 6 [PH] (ref 5–8)
PLATELET # BLD AUTO: 214 10*3/MM3 (ref 140–450)
PMV BLD AUTO: 12.3 FL (ref 6–12)
POTASSIUM SERPL-SCNC: 3 MMOL/L (ref 3.5–5.2)
PROT SERPL-MCNC: 6.6 G/DL (ref 6–8.5)
PROT UR QL STRIP: ABNORMAL
PROTHROMBIN TIME: 13.9 SECONDS (ref 11.9–14.6)
RBC # BLD AUTO: 4.35 10*6/MM3 (ref 3.77–5.28)
RBC # UR: ABNORMAL /HPF
REF LAB TEST METHOD: ABNORMAL
SODIUM SERPL-SCNC: 140 MMOL/L (ref 136–145)
SP GR UR STRIP: >1.03 (ref 1–1.03)
SQUAMOUS #/AREA URNS HPF: ABNORMAL /HPF
TROPONIN T SERPL-MCNC: <0.01 NG/ML (ref 0–0.03)
UROBILINOGEN UR QL STRIP: ABNORMAL
WBC # BLD AUTO: 12.32 10*3/MM3 (ref 3.4–10.8)
WBC UR QL AUTO: ABNORMAL /HPF

## 2020-12-05 PROCEDURE — 80053 COMPREHEN METABOLIC PANEL: CPT | Performed by: NURSE PRACTITIONER

## 2020-12-05 PROCEDURE — 71275 CT ANGIOGRAPHY CHEST: CPT

## 2020-12-05 PROCEDURE — 71045 X-RAY EXAM CHEST 1 VIEW: CPT

## 2020-12-05 PROCEDURE — 87086 URINE CULTURE/COLONY COUNT: CPT | Performed by: NURSE PRACTITIONER

## 2020-12-05 PROCEDURE — 93005 ELECTROCARDIOGRAM TRACING: CPT | Performed by: NURSE PRACTITIONER

## 2020-12-05 PROCEDURE — 85025 COMPLETE CBC W/AUTO DIFF WBC: CPT | Performed by: NURSE PRACTITIONER

## 2020-12-05 PROCEDURE — 84703 CHORIONIC GONADOTROPIN ASSAY: CPT | Performed by: NURSE PRACTITIONER

## 2020-12-05 PROCEDURE — 99284 EMERGENCY DEPT VISIT MOD MDM: CPT

## 2020-12-05 PROCEDURE — 85730 THROMBOPLASTIN TIME PARTIAL: CPT | Performed by: NURSE PRACTITIONER

## 2020-12-05 PROCEDURE — 85379 FIBRIN DEGRADATION QUANT: CPT | Performed by: NURSE PRACTITIONER

## 2020-12-05 PROCEDURE — 93010 ELECTROCARDIOGRAM REPORT: CPT | Performed by: INTERNAL MEDICINE

## 2020-12-05 PROCEDURE — 83605 ASSAY OF LACTIC ACID: CPT | Performed by: NURSE PRACTITIONER

## 2020-12-05 PROCEDURE — 85610 PROTHROMBIN TIME: CPT | Performed by: NURSE PRACTITIONER

## 2020-12-05 PROCEDURE — 36415 COLL VENOUS BLD VENIPUNCTURE: CPT

## 2020-12-05 PROCEDURE — 84484 ASSAY OF TROPONIN QUANT: CPT | Performed by: NURSE PRACTITIONER

## 2020-12-05 PROCEDURE — 81001 URINALYSIS AUTO W/SCOPE: CPT | Performed by: NURSE PRACTITIONER

## 2020-12-05 PROCEDURE — 83690 ASSAY OF LIPASE: CPT | Performed by: NURSE PRACTITIONER

## 2020-12-05 RX ADMIN — SODIUM CHLORIDE 1000 ML: 9 INJECTION, SOLUTION INTRAVENOUS at 22:36

## 2020-12-06 VITALS
HEIGHT: 63 IN | WEIGHT: 143 LBS | DIASTOLIC BLOOD PRESSURE: 78 MMHG | SYSTOLIC BLOOD PRESSURE: 124 MMHG | BODY MASS INDEX: 25.34 KG/M2 | TEMPERATURE: 98.9 F | OXYGEN SATURATION: 98 % | RESPIRATION RATE: 20 BRPM | HEART RATE: 124 BPM

## 2020-12-06 LAB
QT INTERVAL: 304 MS
QTC INTERVAL: 413 MS

## 2020-12-06 PROCEDURE — 25010000002 ONDANSETRON PER 1 MG: Performed by: EMERGENCY MEDICINE

## 2020-12-06 PROCEDURE — 87040 BLOOD CULTURE FOR BACTERIA: CPT | Performed by: NURSE PRACTITIONER

## 2020-12-06 PROCEDURE — 25010000002 CEFTRIAXONE PER 250 MG: Performed by: NURSE PRACTITIONER

## 2020-12-06 PROCEDURE — 96375 TX/PRO/DX INJ NEW DRUG ADDON: CPT

## 2020-12-06 PROCEDURE — 96365 THER/PROPH/DIAG IV INF INIT: CPT

## 2020-12-06 PROCEDURE — 0 IOPAMIDOL PER 1 ML: Performed by: EMERGENCY MEDICINE

## 2020-12-06 RX ORDER — ALBUTEROL SULFATE 1.25 MG/3ML
1 SOLUTION RESPIRATORY (INHALATION) EVERY 6 HOURS PRN
Qty: 25 VIAL | Refills: 0 | Status: SHIPPED | OUTPATIENT
Start: 2020-12-06 | End: 2023-02-03

## 2020-12-06 RX ORDER — ONDANSETRON 2 MG/ML
4 INJECTION INTRAMUSCULAR; INTRAVENOUS ONCE
Status: COMPLETED | OUTPATIENT
Start: 2020-12-06 | End: 2020-12-06

## 2020-12-06 RX ADMIN — CEFTRIAXONE SODIUM 1 G: 1 INJECTION, POWDER, FOR SOLUTION INTRAMUSCULAR; INTRAVENOUS at 00:29

## 2020-12-06 RX ADMIN — IOPAMIDOL 100 ML: 755 INJECTION, SOLUTION INTRAVENOUS at 00:15

## 2020-12-06 RX ADMIN — ONDANSETRON HYDROCHLORIDE 4 MG: 2 SOLUTION INTRAMUSCULAR; INTRAVENOUS at 01:06

## 2020-12-06 NOTE — ED PROVIDER NOTES
Subjective   Patient is a 30-year-old female presents emergency department with Covid symptoms.  Patient states on Wednesday she began developing back pain, chills, and fever.  She states through the night the fever worsened.  She reports the following day she began experiencing all the classic Covid type symptoms including nausea, diarrhea, change in taste and smell, cough with congestion, headache, body aches.  She states that she was tested for Covid on Thursday and notified that she was positive for Covid.  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  She has been quarantined the best she can from her .  She states that her son is currently staying with grandparents.  Patient states today she began feeling short of breath and began experiencing chest pain described as chest pressure.  She has been taking Zofran, Sudafed, and alternating Tylenol with ibuprofen to keep fever down.  Due to symptoms described above she came the emergency department for evaluation and treatment.          Review of Systems   Constitutional: Positive for appetite change, chills and fever.        Positive for change in taste and smell   HENT: Positive for congestion.    Respiratory: Positive for cough and shortness of breath.    Cardiovascular: Positive for chest pain.   Gastrointestinal: Positive for diarrhea and nausea. Negative for abdominal pain and vomiting.   Genitourinary: Negative.    Musculoskeletal: Positive for myalgias.   Skin: Negative.    Neurological: Positive for headaches.   All other systems reviewed and are negative.      Past Medical History:   Diagnosis Date   • Anxiety    • Gallstone    • Tremor        Allergies   Allergen Reactions   • Phenergan [Promethazine Hcl] Nausea And Vomiting   • Wasp Venom Swelling     swelling       Past Surgical History:   Procedure Laterality Date   • CHOLECYSTECTOMY WITH INTRAOPERATIVE CHOLANGIOGRAM N/A 10/1/2020    Procedure: LAPAROSCOPIC CHOLECYSTECTOMY;  Surgeon: Heather Glass  MD;  Location: [facility] OR;  Service: General;  Laterality: N/A;   • VAGINAL DELIVERY     • WISDOM TOOTH EXTRACTION         History reviewed. No pertinent family history.    Social History     Socioeconomic History   • Marital status:      Spouse name: Not on file   • Number of children: Not on file   • Years of education: Not on file   • Highest education level: Not on file   Tobacco Use   • Smoking status: Never Smoker   • Smokeless tobacco: Never Used   Substance and Sexual Activity   • Alcohol use: Never     Frequency: Never   • Drug use: Never   • Sexual activity: Yes     Partners: Male     Birth control/protection: None           Objective   Physical Exam  Vitals signs and nursing note reviewed.   Constitutional:       Appearance: She is well-developed.   HENT:      Head: Normocephalic and atraumatic.      Nose: Nose normal.   Eyes:      Extraocular Movements: Extraocular movements intact.      Conjunctiva/sclera: Conjunctivae normal.      Pupils: Pupils are equal, round, and reactive to light.   Neck:      Musculoskeletal: Normal range of motion and neck supple.   Cardiovascular:      Rate and Rhythm: Normal rate and regular rhythm.      Heart sounds: Normal heart sounds.   Pulmonary:      Effort: Pulmonary effort is normal.      Breath sounds: Rales present.   Abdominal:      General: Bowel sounds are normal.      Palpations: Abdomen is soft.   Musculoskeletal: Normal range of motion.   Skin:     General: Skin is warm and dry.      Capillary Refill: Capillary refill takes less than 2 seconds.   Neurological:      General: No focal deficit present.      Mental Status: She is alert and oriented to person, place, and time.   Psychiatric:         Behavior: Behavior normal.         Procedures           ED Course  ED Course as of Dec 06 0202   Sat Dec 05, 2020   9090 Work up remains pending. This will be a turn over for Dr. Le. See his note for details.    [TW]   Sun Dec 06, 2020   0201 I took over from  Becky Woeltz, NP at shift change.  The patient has been stable here.  I told her his CT scan did not show any signs of a PE.  I think her discomfort in her chest is from the Covid itself.  She has for a incentive spirometer but I think a nebulizer with her more good she is pleased by that.  She is discharged in stable condition.    [TR]      ED Course User Index  [TR] Walker Le Jr., MD  [TW] Becky Barr APRN                                         HEART Score (for prediction of 6-week risk of major adverse cardiac event) reviewed and/or performed as part of the patient evaluation and treatment planning process.  The result associated with this review/performance is: 1       MDM  Number of Diagnoses or Management Options  Chest pain, unspecified type: new and requires workup  COVID-19: new and requires workup     Amount and/or Complexity of Data Reviewed  Clinical lab tests: ordered and reviewed  Tests in the radiology section of CPT®: ordered and reviewed  Tests in the medicine section of CPT®: ordered and reviewed    Risk of Complications, Morbidity, and/or Mortality  Presenting problems: moderate  Diagnostic procedures: moderate  Management options: moderate    Patient Progress  Patient progress: stable      Final diagnoses:   COVID-19   Chest pain, unspecified type            Walker Le Jr., MD  12/06/20 0201

## 2020-12-07 ENCOUNTER — EPISODE CHANGES (OUTPATIENT)
Dept: CASE MANAGEMENT | Facility: OTHER | Age: 30
End: 2020-12-07

## 2020-12-07 LAB — BACTERIA SPEC AEROBE CULT: NORMAL

## 2020-12-11 LAB
BACTERIA SPEC AEROBE CULT: NORMAL
BACTERIA SPEC AEROBE CULT: NORMAL

## 2020-12-14 ENCOUNTER — PATIENT OUTREACH (OUTPATIENT)
Dept: CASE MANAGEMENT | Facility: OTHER | Age: 30
End: 2020-12-14

## 2020-12-14 NOTE — OUTREACH NOTE
Patient Outreach Note    Contacted Dania after she was diagnosed with COVID.  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  She had not started when she was diagnosed.  She is feeling much better and has been released to return to work.  She states she still is coughing and having difficulty tasting food.      She has not had anymore fevers.  Educated Dania on how low vitamin d levels have been associated with Covid diagnosis.  Encouraged her to schedule a follow up  appointment with her PCP due to her Covid diagnosis and to discuss screening her vit D.       No issue with social determinants.  No inabilities to obtain food or medications or transportation to MD appointments. Educated on participating in habits that prevent the spread of COVID virus with home & work hygiene. Patient verbalizes understanding.     Educated patient on benefits of Employee CM program and invited to call with any new needs.  Encouraged use of [facility] nurseline if needed.      Anna Miguel RN  Ambulatory     12/14/2020, 13:55 EST   Anna BUITRAGO, RN, [facility]   [place] [address]     [phone] cell   [phone] office  [phone] fax  [email or web address]  [email or web address]

## 2021-03-25 ENCOUNTER — IMMUNIZATION (OUTPATIENT)
Dept: VACCINE CLINIC | Facility: HOSPITAL | Age: 31
End: 2021-03-25

## 2021-03-25 PROCEDURE — 91301 HC SARSCO02 VAC 100MCG/0.5ML IM: CPT | Performed by: OBSTETRICS & GYNECOLOGY

## 2021-03-25 PROCEDURE — 0011A: CPT | Performed by: OBSTETRICS & GYNECOLOGY

## 2021-04-22 ENCOUNTER — IMMUNIZATION (OUTPATIENT)
Dept: VACCINE CLINIC | Facility: HOSPITAL | Age: 31
End: 2021-04-22

## 2021-04-22 PROCEDURE — 91301 HC SARSCO02 VAC 100MCG/0.5ML IM: CPT | Performed by: OBSTETRICS & GYNECOLOGY

## 2021-04-22 PROCEDURE — 0012A: CPT | Performed by: OBSTETRICS & GYNECOLOGY

## 2022-01-04 ENCOUNTER — OFFICE VISIT (OUTPATIENT)
Dept: OBSTETRICS AND GYNECOLOGY | Facility: CLINIC | Age: 32
End: 2022-01-04

## 2022-01-04 VITALS
DIASTOLIC BLOOD PRESSURE: 76 MMHG | WEIGHT: 148 LBS | SYSTOLIC BLOOD PRESSURE: 126 MMHG | HEIGHT: 63 IN | BODY MASS INDEX: 26.22 KG/M2

## 2022-01-04 DIAGNOSIS — E66.3 OVERWEIGHT WITH BODY MASS INDEX (BMI) OF 26 TO 26.9 IN ADULT: ICD-10-CM

## 2022-01-04 DIAGNOSIS — F32.9 REACTIVE DEPRESSION: ICD-10-CM

## 2022-01-04 DIAGNOSIS — Z30.019 ENCOUNTER FOR INITIAL PRESCRIPTION OF CONTRACEPTIVES, UNSPECIFIED CONTRACEPTIVE: Primary | ICD-10-CM

## 2022-01-04 DIAGNOSIS — Z78.9 NON-SMOKER: ICD-10-CM

## 2022-01-04 LAB
B-HCG UR QL: NEGATIVE
EXPIRATION DATE: NORMAL
INTERNAL NEGATIVE CONTROL: NORMAL
INTERNAL POSITIVE CONTROL: NORMAL
Lab: NORMAL

## 2022-01-04 PROCEDURE — 81025 URINE PREGNANCY TEST: CPT | Performed by: NURSE PRACTITIONER

## 2022-01-04 PROCEDURE — 99214 OFFICE O/P EST MOD 30 MIN: CPT | Performed by: NURSE PRACTITIONER

## 2022-01-04 RX ORDER — CYCLOBENZAPRINE HCL 5 MG
5 TABLET ORAL 3 TIMES DAILY PRN
COMMUNITY
End: 2023-01-06

## 2022-01-04 RX ORDER — NORETHINDRONE ACETATE AND ETHINYL ESTRADIOL 1MG-20(21)
1 KIT ORAL DAILY
Qty: 84 TABLET | Refills: 0 | Status: SHIPPED | OUTPATIENT
Start: 2022-01-04 | End: 2022-02-08 | Stop reason: SDUPTHER

## 2022-01-04 RX ORDER — NORETHINDRONE ACETATE AND ETHINYL ESTRADIOL 1MG-20(21)
1 KIT ORAL DAILY
Qty: 84 TABLET | Refills: 0 | Status: CANCELLED | OUTPATIENT
Start: 2022-01-04

## 2022-01-04 RX ORDER — MULTIPLE VITAMINS W/ MINERALS TAB 9MG-400MCG
1 TAB ORAL DAILY
COMMUNITY
End: 2023-01-06

## 2022-01-04 NOTE — PROGRESS NOTES
"Subjective   Dania Cardoso is a 31 y.o. female.     Contraception    Pt is here wanting to be put on birth control.  Pt is currently on her period and bleeding pretty heavily so does not want to do annual today       The following portions of the patient's history were reviewed and updated as appropriate: allergies, current medications, past family history, past medical history, past social history, past surgical history and problem list.    /76   Ht 160 cm (63\")   Wt 67.1 kg (148 lb)   LMP 01/03/2022   BMI 26.22 kg/m²     Review of Systems   Constitutional: Positive for fatigue. Negative for activity change, appetite change and fever.   HENT: Negative for congestion, sore throat and trouble swallowing.    Eyes: Negative for pain, discharge and visual disturbance.   Respiratory: Negative for apnea, shortness of breath and wheezing.    Cardiovascular: Negative for chest pain, palpitations and leg swelling.   Gastrointestinal: Negative for abdominal pain, constipation and diarrhea.   Genitourinary: Negative for frequency, pelvic pain, urgency and vaginal discharge.        Monthly  5-7 days  3-7 days moderate to heavy.   Cramping and breast tenderness.    Musculoskeletal: Negative for back pain and gait problem.   Skin: Negative for color change and rash.   Neurological: Negative for dizziness, weakness and numbness.   Psychiatric/Behavioral: Negative for confusion, self-injury, sleep disturbance and suicidal ideas.        Overwhelmed  Tired all the time.   Difficulty concentrating.            Objective   Physical Exam  Vitals reviewed.   Constitutional:       Appearance: She is well-developed.   Eyes:      General:         Right eye: No discharge.         Left eye: No discharge.   Cardiovascular:      Rate and Rhythm: Normal rate and regular rhythm.   Pulmonary:      Effort: Pulmonary effort is normal.      Breath sounds: Normal breath sounds.   Skin:     General: Skin is warm.   Neurological:      Mental " Status: She is alert and oriented to person, place, and time.   Psychiatric:         Behavior: Behavior normal.         Thought Content: Thought content normal. Thought content does not include homicidal or suicidal ideation. Thought content does not include homicidal or suicidal plan.         Judgment: Judgment normal.         Assessment/Plan   Discussed pt PHQ 9 score.   Score 27.   Pt denies current thoughts and plans of harming herself or others.   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   Discussed pt options to help with mood.   Pt opts to schedule with [facility] services r/t she went there prior to divorce and had a good experience.   Pt reports she will schedule the appt.   Pt declines medication at this time but will consider.   Labs today.   Pt advised to call with any questions or concerns.   Discussed S&S to report. Pt voiced understanding.     Discussed BC options, risks and benefits.  UPT negative.   Pt opts to start OCP as she took them in the past.   Instructed pt about beginning OCP, side effects and S&S to report. Pt voiced understanding.  Microgestin sent to pharmacy.     Patient's Body mass index is 26.22 kg/m². indicating that she is overweight (BMI 25-29.9). Obesity-related health conditions include the following: none. Obesity is unchanged. BMI is is above average; no BMI management plan is appropriate. We discussed portion control and increasing exercise..    RV annual exam (soon)/prn.   Diagnoses and all orders for this visit:    1. Encounter for initial prescription of contraceptives, unspecified contraceptive (Primary)  -     POC Pregnancy, Urine    2. Reactive depression  -     T4, Free  -     TSH  -     Vitamin D 25 Hydroxy    3. Overweight with body mass index (BMI) of 26 to 26.9 in adult    4. Non-smoker    Other orders  -     norethindrone-ethinyl estradiol FE (Microgestin FE 1/20) 1-20 MG-MCG per tablet; Take 1 tablet by  mouth Daily.  Dispense: 84 tablet; Refill: 0

## 2022-01-05 LAB
25(OH)D3+25(OH)D2 SERPL-MCNC: 26.8 NG/ML (ref 30–100)
T4 FREE SERPL-MCNC: 1.01 NG/DL (ref 0.93–1.7)
TSH SERPL DL<=0.005 MIU/L-ACNC: 1.73 UIU/ML (ref 0.27–4.2)

## 2022-01-06 RX ORDER — ERGOCALCIFEROL 1.25 MG/1
50000 CAPSULE ORAL WEEKLY
Qty: 12 CAPSULE | Refills: 0 | Status: SHIPPED | OUTPATIENT
Start: 2022-01-06 | End: 2023-02-03 | Stop reason: ALTCHOICE

## 2022-02-08 ENCOUNTER — OFFICE VISIT (OUTPATIENT)
Dept: OBSTETRICS AND GYNECOLOGY | Facility: CLINIC | Age: 32
End: 2022-02-08

## 2022-02-08 VITALS
BODY MASS INDEX: 25.87 KG/M2 | HEIGHT: 63 IN | WEIGHT: 146 LBS | DIASTOLIC BLOOD PRESSURE: 70 MMHG | SYSTOLIC BLOOD PRESSURE: 98 MMHG

## 2022-02-08 DIAGNOSIS — F32.9 REACTIVE DEPRESSION: ICD-10-CM

## 2022-02-08 DIAGNOSIS — Z78.9 NON-SMOKER: ICD-10-CM

## 2022-02-08 DIAGNOSIS — Z11.3 SCREEN FOR STD (SEXUALLY TRANSMITTED DISEASE): ICD-10-CM

## 2022-02-08 DIAGNOSIS — Z01.419 ENCOUNTER FOR GYNECOLOGICAL EXAMINATION WITHOUT ABNORMAL FINDING: Primary | ICD-10-CM

## 2022-02-08 DIAGNOSIS — E66.3 OVERWEIGHT (BMI 25.0-29.9): ICD-10-CM

## 2022-02-08 PROCEDURE — 99395 PREV VISIT EST AGE 18-39: CPT | Performed by: NURSE PRACTITIONER

## 2022-02-08 PROCEDURE — 87624 HPV HI-RISK TYP POOLED RSLT: CPT | Performed by: NURSE PRACTITIONER

## 2022-02-08 PROCEDURE — G0123 SCREEN CERV/VAG THIN LAYER: HCPCS | Performed by: NURSE PRACTITIONER

## 2022-02-08 PROCEDURE — 87625 HPV TYPES 16 & 18 ONLY: CPT | Performed by: NURSE PRACTITIONER

## 2022-02-08 RX ORDER — NORETHINDRONE ACETATE AND ETHINYL ESTRADIOL 1MG-20(21)
1 KIT ORAL DAILY
Qty: 84 TABLET | Refills: 3 | Status: SHIPPED | OUTPATIENT
Start: 2022-02-08 | End: 2023-01-06

## 2022-02-08 NOTE — PROGRESS NOTES
"Chief Complaint  Annual Exam (Patient is here for an annual exam, pt c/o still having some issues with depression, seems to not be as bad since starting vitamin D but still noticing some )    Subjective          Dania Cardoso presents to [facility]  Annual exam    Pt doing well on Vit D and OCP      Review of Systems   Constitutional: Negative for activity change, appetite change, fatigue and fever.   HENT: Negative for congestion, sore throat and trouble swallowing.    Eyes: Negative for pain, discharge and visual disturbance.   Respiratory: Negative for apnea, shortness of breath and wheezing.    Cardiovascular: Negative for chest pain, palpitations and leg swelling.   Gastrointestinal: Negative for abdominal pain, constipation and diarrhea.   Genitourinary: Negative for frequency, pelvic pain, urgency and vaginal discharge.   Musculoskeletal: Negative for back pain and gait problem.   Skin: Negative for color change and rash.   Neurological: Negative for dizziness, weakness and numbness.   Psychiatric/Behavioral: Negative for confusion and sleep disturbance.        Objective   Vital Signs:   BP 98/70   Ht 160 cm (63\")   Wt 66.2 kg (146 lb)   BMI 25.86 kg/m²     Physical Exam  Vitals and nursing note reviewed. Exam conducted with a chaperone present.   Constitutional:       General: She is not in acute distress.     Appearance: She is well-developed. She is not diaphoretic.   HENT:      Head: Normocephalic.      Right Ear: External ear normal.      Left Ear: External ear normal.      Nose: Nose normal.   Eyes:      General: No scleral icterus.        Right eye: No discharge.         Left eye: No discharge.      Conjunctiva/sclera: Conjunctivae normal.      Pupils: Pupils are equal, round, and reactive to light.   Neck:      Thyroid: No thyromegaly.      Vascular: No carotid bruit.      Trachea: No tracheal deviation.   Cardiovascular:      Rate and Rhythm: Normal rate and regular " rhythm.      Heart sounds: Normal heart sounds. No murmur heard.      Pulmonary:      Effort: Pulmonary effort is normal. No respiratory distress.      Breath sounds: Normal breath sounds. No wheezing.   Chest:   Breasts: Breasts are symmetrical.      Right: Normal. No swelling, bleeding, inverted nipple, mass, nipple discharge, skin change, tenderness, axillary adenopathy or supraclavicular adenopathy.      Left: Normal. No swelling, bleeding, inverted nipple, mass, nipple discharge, skin change, tenderness, axillary adenopathy or supraclavicular adenopathy.       Abdominal:      General: There is no distension.      Palpations: Abdomen is soft. There is no mass.      Tenderness: There is no abdominal tenderness. There is no right CVA tenderness, left CVA tenderness or guarding.      Hernia: No hernia is present. There is no hernia in the left inguinal area or right inguinal area.   Genitourinary:     General: Normal vulva.      Exam position: Lithotomy position.      Labia:         Right: No rash, tenderness, lesion or injury.         Left: No rash, tenderness, lesion or injury.       Vagina: Normal. No signs of injury and foreign body. No vaginal discharge, erythema, tenderness or bleeding.      Cervix: Normal.      Uterus: Normal. Not enlarged, not fixed and not tender.       Adnexa: Right adnexa normal and left adnexa normal.        Right: No mass, tenderness or fullness.          Left: No mass, tenderness or fullness.        Rectum: Normal. No mass.      Comments:   BSU normal  Urethral meatus  Normal  Perineum  Normal  Musculoskeletal:         General: No tenderness. Normal range of motion.      Cervical back: Normal range of motion and neck supple.   Lymphadenopathy:      Head:      Right side of head: No submental, submandibular, tonsillar, preauricular, posterior auricular or occipital adenopathy.      Left side of head: No submental, submandibular, tonsillar, preauricular, posterior auricular or occipital  adenopathy.      Cervical: No cervical adenopathy.      Right cervical: No superficial, deep or posterior cervical adenopathy.     Left cervical: No superficial, deep or posterior cervical adenopathy.      Upper Body:      Right upper body: No supraclavicular, axillary or pectoral adenopathy.      Left upper body: No supraclavicular, axillary or pectoral adenopathy.      Lower Body: No right inguinal adenopathy. No left inguinal adenopathy.   Skin:     General: Skin is warm and dry.      Findings: No bruising, erythema or rash.   Neurological:      Mental Status: She is alert and oriented to person, place, and time.      Coordination: Coordination normal.   Psychiatric:         Mood and Affect: Mood normal.         Behavior: Behavior normal.         Thought Content: Thought content normal.         Judgment: Judgment normal.         Result Review :                     Assessment and Plan      Well woman GYN exam.   Pap smear done per ASCCP guidelines.   Will have lab work at PCP.     Encouraged SBE, pt is aware how to do self breast exam and the importance of same.   Discussed weight management and importance of maintaining a healthy weight.   Discussed Vitamin D intake and the importance of adequate vitamin D for both Bone Health and a healthy immune system.    Discussed Daily exercise and the importance of same, in regards to a healthy heart as well as helping to maintain her weight and improving her mental health.     Discussed STD prevention and testing.   Chlamydia/Gonorrhea/Trichomonas added to pap.   RPR, Hep panel and HIV ordered.     Pt has not scheduled with counseling yet.  Pt reports she will schedule with counseling.     Pt reports doing well with Vit D supplement.     Pt reports she is doing well on OCP and desires to continue. Denies HA, BTB, heavy bleeding and cramping.   Will continue OCP.    Diagnoses and all orders for this visit:    1. Encounter for gynecological examination without abnormal  finding (Primary)  -     Liquid-based Pap Smear, Screening  -     HPV DNA Probe, Direct - ThinPrep Vial, Cervix  -     HPV, Aptima High 16 / 18,45    2. Reactive depression  -     Vitamin D 25 Hydroxy    3. Overweight (BMI 25.0-29.9)    4. Non-smoker    5. Screen for STD (sexually transmitted disease)  -     Liquid-based Pap Smear, Screening  -     Hepatitis Panel, Acute  -     HIV-1 / O / 2 Ag / Antibody 4th Generation  -     RPR  -     HPV DNA Probe, Direct - ThinPrep Vial, Cervix  -     HPV, Aptima High 16 / 18,45    Other orders  -     norethindrone-ethinyl estradiol FE (Microgestin FE 1/20) 1-20 MG-MCG per tablet; Take 1 tablet by mouth Daily.  Dispense: 84 tablet; Refill: 3          Patient's Body mass index is 25.86 kg/m². indicating that she is overweight (BMI 25-29.9). Patient's (Body mass index is 25.86 kg/m².) indicates that they are overweight with health conditions that include none . Weight is improving with lifestyle modifications. BMI is is above average; no BMI management plan is appropriate. We discussed portion control and increasing exercise. .       Follow Up   Return for Annual physical.    Patient was given instructions and counseling regarding her condition or for health maintenance advice. Please see specific information pulled into the AVS if appropriate.

## 2022-02-08 NOTE — PATIENT INSTRUCTIONS
"BMI for Adults  What is BMI?  Body mass index (BMI) is a number that is calculated from a person's weight and height. BMI can help estimate how much of a person's weight is composed of fat. BMI does not measure body fat directly. Rather, it is an alternative to procedures that directly measure body fat, which can be difficult and expensive.  BMI can help identify people who may be at higher risk for certain medical problems.  What are BMI measurements used for?  BMI is used as a screening tool to identify possible weight problems. It helps determine whether a person is obese, overweight, a healthy weight, or underweight.  BMI is useful for:  · Identifying a weight problem that may be related to a medical condition or may increase the risk for medical problems.  · Promoting changes, such as changes in diet and exercise, to help reach a healthy weight. BMI screening can be repeated to see if these changes are working.  How is BMI calculated?  BMI involves measuring your weight in relation to your height. Both height and weight are measured, and the BMI is calculated from those numbers. This can be done either in English (U.S.) or metric measurements. Note that charts and online BMI calculators are available to help you find your BMI quickly and easily without having to do these calculations yourself.  To calculate your BMI in English (U.S.) measurements:    1. Measure your weight in pounds (lb).  2. Multiply the number of pounds by 703.  ? For example, for a person who weighs 180 lb, multiply that number by 703, which equals 126,540.  3. Measure your height in inches. Then multiply that number by itself to get a measurement called \"inches squared.\"  ? For example, for a person who is 70 inches tall, the \"inches squared\" measurement is 70 inches x 70 inches, which equals 4,900 inches squared.  4. Divide the total from step 2 (number of lb x 703) by the total from step 3 (inches squared): 126,540 ÷ 4,900 = 25.8. This is " "your BMI.    To calculate your BMI in metric measurements:  1. Measure your weight in kilograms (kg).  2. Measure your height in meters (m). Then multiply that number by itself to get a measurement called \"meters squared.\"  ? For example, for a person who is 1.75 m tall, the \"meters squared\" measurement is 1.75 m x 1.75 m, which is equal to 3.1 meters squared.  3. Divide the number of kilograms (your weight) by the meters squared number. In this example: 70 ÷ 3.1 = 22.6. This is your BMI.  What do the results mean?  BMI charts are used to identify whether you are underweight, normal weight, overweight, or obese. The following guidelines will be used:  · Underweight: BMI less than 18.5.  · Normal weight: BMI between 18.5 and 24.9.  · Overweight: BMI between 25 and 29.9.  · Obese: BMI of 30 or above.  Keep these notes in mind:  · Weight includes both fat and muscle, so someone with a muscular build, such as an athlete, may have a BMI that is higher than 24.9. In cases like these, BMI is not an accurate measure of body fat.  · To determine if excess body fat is the cause of a BMI of 25 or higher, further assessments may need to be done by a health care provider.  · BMI is usually interpreted in the same way for men and women.  Where to find more information  For more information about BMI, including tools to quickly calculate your BMI, go to these websites:  · Centers for Disease Control and Prevention: www.cdc.gov  · American Heart Association: www.heart.org  · National Heart, Lung, and Blood [place]: www.nhlbi.nih.gov  Summary  · Body mass index (BMI) is a number that is calculated from a person's weight and height.  · BMI may help estimate how much of a person's weight is composed of fat. BMI can help identify those who may be at higher risk for certain medical problems.  · BMI can be measured using English measurements or metric measurements.  · BMI charts are used to identify whether you are underweight, normal " weight, overweight, or obese.  This information is not intended to replace advice given to you by your health care provider. Make sure you discuss any questions you have with your health care provider.  Document Revised: 09/09/2020 Document Reviewed: 07/17/2020  Elsevier Patient Education © 2021 Elsevier Inc.

## 2022-02-09 LAB
25(OH)D3+25(OH)D2 SERPL-MCNC: 38.2 NG/ML (ref 30–100)
HAV IGM SERPL QL IA: NEGATIVE
HBV CORE IGM SERPL QL IA: NEGATIVE
HBV SURFACE AG SERPL QL IA: NEGATIVE
HCV AB S/CO SERPL IA: <0.1 S/CO RATIO (ref 0–0.9)
HIV 1+2 AB+HIV1 P24 AG SERPL QL IA: NON REACTIVE
RPR SER QL: NON REACTIVE

## 2022-02-10 LAB
GEN CATEG CVX/VAG CYTO-IMP: ABNORMAL
HPV I/H RISK 4 DNA CVX QL PROBE+SIG AMP: DETECTED
HPV16 DNA SPEC QL NAA+PROBE: NOT DETECTED
HPV18+45 E6+E7 MRNA CVX QL NAA+PROBE: DETECTED
LAB AP CASE REPORT: ABNORMAL
LAB AP GYN ADDITIONAL INFORMATION: ABNORMAL
PATH INTERP SPEC-IMP: ABNORMAL
STAT OF ADQ CVX/VAG CYTO-IMP: ABNORMAL

## 2022-03-08 ENCOUNTER — OFFICE VISIT (OUTPATIENT)
Dept: OBSTETRICS AND GYNECOLOGY | Facility: CLINIC | Age: 32
End: 2022-03-08

## 2022-03-08 VITALS
WEIGHT: 149 LBS | DIASTOLIC BLOOD PRESSURE: 78 MMHG | SYSTOLIC BLOOD PRESSURE: 112 MMHG | HEIGHT: 63 IN | BODY MASS INDEX: 26.4 KG/M2

## 2022-03-08 DIAGNOSIS — R87.611 PAP SMEAR OF CERVIX WITH ASCUS, CANNOT EXCLUDE HGSIL: Primary | ICD-10-CM

## 2022-03-08 PROCEDURE — 88305 TISSUE EXAM BY PATHOLOGIST: CPT | Performed by: OBSTETRICS & GYNECOLOGY

## 2022-03-08 PROCEDURE — 57455 BIOPSY OF CERVIX W/SCOPE: CPT | Performed by: OBSTETRICS & GYNECOLOGY

## 2022-03-08 PROCEDURE — 81025 URINE PREGNANCY TEST: CPT | Performed by: OBSTETRICS & GYNECOLOGY

## 2022-03-08 NOTE — PROGRESS NOTES
"Dania Cardoso is a 31 y.o. female  here today for colposcopy.  Her most recent Pap smear was read as ASC-H with HPV 18/45.  She has had the HPV vaccine.    /78   Ht 160 cm (63\")   Wt 67.6 kg (149 lb)   Breastfeeding No   BMI 26.39 kg/m²      A urine pregnancy test in the office today was negative.    Colposcopy was performed in the office today.  She was placed in the lithotomy position on the exam table.  A speculum was inserted and the cervix well visualized.  The cervix was cleansed with acetic acid swabs.  Inspection with the colposcope showed the transformation zone on the ectocervix.  It was seen in its entirety.  There were faint acetowhite lesions noted at 12:00.  There was some active metaplasia noted.  Colposcopy was satisfactory. The cervix was anesthetized with Hurricaine spray.  A 12:00 biopsy was performed.  The biopsy site was made hemostatic with a silver nitrate stick.  An endocervical curettage was not performed.  She tolerated the procedure well.    Colposcopic impression: Mild dysplasia    We will notify her when the pathology report is available to discuss further care.  We have discussed post colposcopy instructions.   "

## 2022-03-09 LAB
CYTO UR: NORMAL
LAB AP CASE REPORT: NORMAL
LAB AP CLINICAL INFORMATION: NORMAL
PATH REPORT.FINAL DX SPEC: NORMAL
PATH REPORT.GROSS SPEC: NORMAL

## 2022-06-20 RX ORDER — ERGOCALCIFEROL 1.25 MG/1
50000 CAPSULE ORAL WEEKLY
Qty: 12 CAPSULE | Refills: 0 | OUTPATIENT
Start: 2022-06-20

## 2023-01-06 ENCOUNTER — INITIAL PRENATAL (OUTPATIENT)
Dept: OBSTETRICS AND GYNECOLOGY | Facility: CLINIC | Age: 33
End: 2023-01-06

## 2023-01-06 VITALS — DIASTOLIC BLOOD PRESSURE: 68 MMHG | WEIGHT: 177 LBS | SYSTOLIC BLOOD PRESSURE: 118 MMHG | BODY MASS INDEX: 31.35 KG/M2

## 2023-01-06 DIAGNOSIS — O09.299 HISTORY OF PRE-ECLAMPSIA IN PRIOR PREGNANCY, CURRENTLY PREGNANT: ICD-10-CM

## 2023-01-06 DIAGNOSIS — N87.0 DYSPLASIA OF CERVIX, LOW GRADE (CIN 1): ICD-10-CM

## 2023-01-06 DIAGNOSIS — Z34.81 PRENATAL CARE, SUBSEQUENT PREGNANCY, FIRST TRIMESTER: Primary | ICD-10-CM

## 2023-01-06 DIAGNOSIS — Z78.9 NON-SMOKER: ICD-10-CM

## 2023-01-06 DIAGNOSIS — Z3A.09 9 WEEKS GESTATION OF PREGNANCY: ICD-10-CM

## 2023-01-06 PROCEDURE — 99214 OFFICE O/P EST MOD 30 MIN: CPT | Performed by: ADVANCED PRACTICE MIDWIFE

## 2023-01-06 RX ORDER — ELECTROLYTES/DEXTROSE
1 SOLUTION, ORAL ORAL NIGHTLY
COMMUNITY

## 2023-01-06 NOTE — PROGRESS NOTES
32-year old patient arrived to initiate prenatal care.     HPI: 32-year old . Patient's last menstrual period was 2022. Reports this was a planned pregnancy. She stopped taking birth control in October. They have a blended family with 5 boys. She is having regular morning sickness. Pre-pregnancy weight of 177 pounds.    Previous prenatal history significant for: TSVD x 1, preeclampsia  History significant for: SHANTA-I, anxiety, depression, migraine    The following portion of the patient's history were reviewed and updated as needed: allergies, current medications, past family history, past medical history, social history, surgical history, and problem list.    ROS: All systems reviewed and are negative with exception of the following: breast tenderness, fatigue, nausea, vomiting      US ordered today, reviewed and shows IUP of 9w3d gestation and Estimated Date of Delivery: 23  Last Pap Smear: 2022 ASC-H; HPV detected (subtype 18/45 detected)  Colposcopy: 2022 low grade squamous intraepithelial lesion, mild dysplasia (SHANTA-1)    Exam:  Wt: 177 lb for TWG of 0 kg (0 lb), B/P 118/68, FHTs 160  General Appearance:  healthy-appearing .  HEENT:  NCAT, EOMI, neck supple, no thyroidmegaly.  HR str and reg. No murmur. Lungs clear. Resp even and unlabored.  Abd: Soft, nontender. Bowel sounds active. Uterus is consistent with EGA.  Ext: NT, nontender. No cyanosis or edema.    Diagnoses and all orders for this visit:    1. Prenatal care, subsequent pregnancy, first trimester (Primary)  - Reviewed information in new OB packet, including OTC medications for use during pregnancy, first timester of pregnancy and discomforts, regular OB routine, ffDNA and maternal carrier screening testing.  First Trimester of Pregnancy video included in AVS.  - Advised to maintain regular activity.  - Reviewed and encouraged pt to report vaginal bleeding, pelvic pain or cramping, any prolonged illness or infection,  or any other concerns.  - RTO in 4 weeks with Dr. Perez and as needed with concerns  - Discussed and ordered initial prenatal labs today:  -     ABO / Rh  -     Ambulatory Referral to MFM/Perinatology  -     Antibody Screen  -     CBC & Differential  -     Chlamydia trachomatis, Neisseria gonorrhoeae, PCR w/ confirmation - Urine, Urine, Clean Catch  -     Hepatitis B Surface Antigen  -     Hepatitis C Antibody  -     Urine Culture - Urine, Urine, Clean Catch  -     ToxASSURE Select 13 (MW) - Urine, Clean Catch  -     Rubella Antibody, IgG  -     RPR, Rfx Qn RPR / Confirm TP  -     HIV-1 / O / 2 Ag / Antibody 4th Generation  -     AMB Referral to Motherhood Connection Program (ONLY [place] Medicaid)    2. 9 weeks gestation of pregnancy    3. Dysplasia of cervix, low grade (SHANTA 1)  - Will plan for pap smear with March appointment.     4. History of pre-eclampsia in prior pregnancy, currently pregnant  - Will plan for early 24-hour urine for a baseline. She believes she was placed on magnesium with labor.    5. Non-smoker        This note has been signed electronically.    Hansa Cabrera, DNP, APRN, CNM, RNC-OB

## 2023-01-06 NOTE — PATIENT INSTRUCTIONS
Doxylamine (Unisom) 1/2 or 1/4 tablet plus Vitamin B6 25 mg every 6 hours as needed for pregnancy nausea.  Take together for best result.

## 2023-01-09 ENCOUNTER — REFERRAL TRIAGE (OUTPATIENT)
Dept: LABOR AND DELIVERY | Facility: HOSPITAL | Age: 33
End: 2023-01-09

## 2023-01-16 LAB
ABO GROUP BLD: NORMAL
BACTERIA UR CULT: NORMAL
BACTERIA UR CULT: NORMAL
BASOPHILS # BLD AUTO: 0.04 10*3/MM3 (ref 0–0.2)
BASOPHILS NFR BLD AUTO: 0.4 % (ref 0–1.5)
BLD GP AB SCN SERPL QL: NEGATIVE
C TRACH RRNA SPEC QL NAA+PROBE: NEGATIVE
DRUGS UR: NORMAL
EOSINOPHIL # BLD AUTO: 0.05 10*3/MM3 (ref 0–0.4)
EOSINOPHIL NFR BLD AUTO: 0.5 % (ref 0.3–6.2)
ERYTHROCYTE [DISTWIDTH] IN BLOOD BY AUTOMATED COUNT: 12.2 % (ref 12.3–15.4)
HBV SURFACE AG SERPL QL IA: NEGATIVE
HCT VFR BLD AUTO: 41.4 % (ref 34–46.6)
HCV AB S/CO SERPL IA: <0.1 S/CO RATIO (ref 0–0.9)
HGB BLD-MCNC: 14.1 G/DL (ref 12–15.9)
HIV 1+2 AB+HIV1 P24 AG SERPL QL IA: NON REACTIVE
IMM GRANULOCYTES # BLD AUTO: 0.02 10*3/MM3 (ref 0–0.05)
IMM GRANULOCYTES NFR BLD AUTO: 0.2 % (ref 0–0.5)
LYMPHOCYTES # BLD AUTO: 2.2 10*3/MM3 (ref 0.7–3.1)
LYMPHOCYTES NFR BLD AUTO: 23.8 % (ref 19.6–45.3)
MCH RBC QN AUTO: 30.9 PG (ref 26.6–33)
MCHC RBC AUTO-ENTMCNC: 34.1 G/DL (ref 31.5–35.7)
MCV RBC AUTO: 90.6 FL (ref 79–97)
MONOCYTES # BLD AUTO: 0.48 10*3/MM3 (ref 0.1–0.9)
MONOCYTES NFR BLD AUTO: 5.2 % (ref 5–12)
N GONORRHOEA RRNA SPEC QL NAA+PROBE: NEGATIVE
NEUTROPHILS # BLD AUTO: 6.46 10*3/MM3 (ref 1.7–7)
NEUTROPHILS NFR BLD AUTO: 69.9 % (ref 42.7–76)
NRBC BLD AUTO-RTO: 0 /100 WBC (ref 0–0.2)
PLATELET # BLD AUTO: 243 10*3/MM3 (ref 140–450)
RBC # BLD AUTO: 4.57 10*6/MM3 (ref 3.77–5.28)
RH BLD: POSITIVE
RPR SER QL: NON REACTIVE
RUBV IGG SERPL IA-ACNC: 2.35 INDEX
WBC # BLD AUTO: 9.25 10*3/MM3 (ref 3.4–10.8)

## 2023-02-03 ENCOUNTER — ROUTINE PRENATAL (OUTPATIENT)
Dept: OBSTETRICS AND GYNECOLOGY | Facility: CLINIC | Age: 33
End: 2023-02-03
Payer: MEDICAID

## 2023-02-03 ENCOUNTER — PATIENT OUTREACH (OUTPATIENT)
Dept: LABOR AND DELIVERY | Facility: HOSPITAL | Age: 33
End: 2023-02-03
Payer: MEDICAID

## 2023-02-03 VITALS — SYSTOLIC BLOOD PRESSURE: 118 MMHG | BODY MASS INDEX: 30.65 KG/M2 | WEIGHT: 173 LBS | DIASTOLIC BLOOD PRESSURE: 86 MMHG

## 2023-02-03 DIAGNOSIS — O09.299 HISTORY OF PRE-ECLAMPSIA IN PRIOR PREGNANCY, CURRENTLY PREGNANT: Primary | ICD-10-CM

## 2023-02-03 DIAGNOSIS — Z34.82 ENCOUNTER FOR SUPERVISION OF OTHER NORMAL PREGNANCY IN SECOND TRIMESTER: ICD-10-CM

## 2023-02-03 LAB
GLUCOSE UR STRIP-MCNC: NEGATIVE MG/DL
PROT UR STRIP-MCNC: ABNORMAL MG/DL

## 2023-02-03 PROCEDURE — 99213 OFFICE O/P EST LOW 20 MIN: CPT | Performed by: OBSTETRICS & GYNECOLOGY

## 2023-02-03 RX ORDER — METOCLOPRAMIDE 10 MG/1
10 TABLET ORAL 3 TIMES DAILY PRN
Qty: 30 TABLET | Refills: 2 | Status: SHIPPED | OUTPATIENT
Start: 2023-02-03

## 2023-02-03 NOTE — PATIENT INSTRUCTIONS
[facility] Motherhood [facility]    Many moms-to-be feel a bit better from weeks 13 to 28 of their pregnancy. You may not be as tired or nauseated as you were during your first trimester. Your baby is doing a lot of growing during this time, though. You'll typically continue seeing your prenatal provider every four weeks. Most pregnant women feel energetic and healthy during this trimester    You'll undergo a few routine tests during this period of your pregnancy:  Rh testing: Your provider will test your blood's Rhesus (Rh) factor, which is an immune system-related protein most people have in their blood.    Oral glucose challenge test: Between your 24th and 28th weeks of pregnancy, you'll visit a lab and drink a sweet liquid (called Glucola) to test for gestational diabetes. The test takes about an hour. Gestational diabetes is a unique type of blood sugar condition that only develops when you're pregnant.     Ultrasound: This safe test uses sound waves to create images of your baby. It can help detect some medical conditions your baby might be developing, such as cleft lip/cleft palate. However, it can also be a very exciting test because your ultrasound technician may be able to tell you your baby's gender if you want to know.     If 4-D ultrasound is available, you may be able to see your baby moving or sucking his or her thumb.     Amniocentesis: This test gives your doctor or midwife more detailed information about your baby's health. However, not every expectant mom needs this test, which involves drawing a sample of the amniotic fluid surrounding your baby. Your provider might recommend this test if you:    Had concerning screening test results earlier in your pregnancy.    Have been pregnant before with a baby who had medical issues.    Are age 35 or older.    Call your healthcare provider or go to the nearest emergency room if you experience any of these symptoms:  Intense cramping or abdominal pain    A significant decrease in your baby's movement after week 28 (fewer than about 6 to 10 movements in an hour).  Shortness of breath or trouble breathing.  Tightening or pain in your back or lower abdomen that could be contractions.  Feeling of intense pressure in your pelvis or vagina.  Any vaginal bleeding or leaking fluid.    Things to start thinking of:  Feeding plan for your [place]  Choosing a Pediatrician for your     Breastfeeding:   Insurance will pay for one breast pump per pregnancy. You can check with your insurance for coverage limits and to order through their preferred company or you can use any of the following websites:     The web sites listed can be used to order a breast pump:    https://[employer].Mobiotics/    https://www.[employer]    https://www.[employer].Mobiotics/    Information and support for breastfeeding can be found here:    La Leche League International:  https://www.llli.org/    WIC Breastfeeding Support: https://wicbreastfeeding.fns.usda.gov/    [facility]: https://[facility].ky.gov/agencies/dph/dmch/nsb/Pages/breastfeeding.aspx    : https://www.marchofdimes.org/baby/breastfeeding-your-baby     Department of Health & Human Services: https://www.womenshealth.gov/breastfeeding/learning-breastfeed/finding-breastfeeding-support-and-information    [facility] Lactation Consultant  If you have questions or concerns about breastfeeding, you can contact  Prudence VALENTE  [facility]  [phone]    Formula feeding    For formula feeding, you can find useful information on these websites:    : https://www.marchofdimes.org/baby/feeding-your-baby-formula.aspx    Center for Disease Control and Prevention: https://www.cdc.gov/nutrition/infantandtoddlernutrition/formula-feeding/index.htm    Kids Health Organization: https://kidshealth.org/en/parents/formulafeed-starting.html    MothersBabies:  https://www.mothersbabies.org/baby/?gclid=[email or web address]_[email or web address]

## 2023-02-03 NOTE — OUTREACH NOTE
[facility]  Enrollment    Current Estimated Gestational Age: 13w3d    Questions/Answers    Flowsheet Row Responses   Would like to participate? Yes   Date of Intake Visit 02/03/23          [facility]  Intake    Current Estimated Gestational Age: 13w3d    Intake Assessment    Flowsheet Row Responses   Best Method for Contacting Cell   Able to keep appointments as scheduled Yes   Do you have a dentist? No  [Dental Clinic information in resource letter]   Resources Presently Utilizing: None   Maternal Warning Signs Provided  [sent to client in mychart]   Other: Provided  [second trimester of pregnancy and round ligament pain [facility]   Other Education HANDS, How to find a dentist, How to find a pediatrician, How to find a primary care provider, Insurance benefits/Incentives, Meds to Beds, Mental Health Services, Smoking/Vaping Cessation, SNAP Benefits, Substance Use Disorder Treatment, Transportation Assistance, WIC Benefits          Learning Assessment    Flowsheet Row Responses   Relationship Patient   Does the learner have any barriers to learning? No Barriers   What is the preferred language of the learner for medical teaching? English   How does the learner prefer to learn new concepts? Listening, Reading, Demonstration, Pictures/Video        Resource letter, prenatal education, and maternal warning signs sent to client in her mychart.         Desire Silva, RN  Maternity Nurse Navigator    2/3/2023, 14:24 CST          Desire Silva RN  Maternity Nurse Navigator    2/3/2023, 14:23 CST

## 2023-02-03 NOTE — PROGRESS NOTES
Having reflux and nausea  Reviewed normal prenatal labs  ffDNA today    Diagnoses and all orders for this visit:    1. History of pre-eclampsia in prior pregnancy, currently pregnant (Primary)  -     metoclopramide (Reglan) 10 MG tablet; Take 1 tablet by mouth 3 (Three) Times a Day As Needed (nausea).  Dispense: 30 tablet; Refill: 2    2. Encounter for supervision of other normal pregnancy in second trimester  -     Alejandrina York Prenatal Test: Chromosomes 13, 18, 21, X & Y: Triploidy 22Q.11.2 Deletion - Blood,

## 2023-03-03 ENCOUNTER — ROUTINE PRENATAL (OUTPATIENT)
Dept: OBSTETRICS AND GYNECOLOGY | Facility: CLINIC | Age: 33
End: 2023-03-03
Payer: MEDICAID

## 2023-03-03 VITALS — BODY MASS INDEX: 31 KG/M2 | DIASTOLIC BLOOD PRESSURE: 70 MMHG | WEIGHT: 175 LBS | SYSTOLIC BLOOD PRESSURE: 124 MMHG

## 2023-03-03 DIAGNOSIS — Z34.82 PRENATAL CARE, SUBSEQUENT PREGNANCY, SECOND TRIMESTER: Primary | ICD-10-CM

## 2023-03-03 DIAGNOSIS — Z3A.17 17 WEEKS GESTATION OF PREGNANCY: ICD-10-CM

## 2023-03-03 DIAGNOSIS — Z78.9 NON-SMOKER: ICD-10-CM

## 2023-03-03 LAB
GLUCOSE UR STRIP-MCNC: NEGATIVE MG/DL
PROT UR STRIP-MCNC: ABNORMAL MG/DL

## 2023-03-03 PROCEDURE — 99213 OFFICE O/P EST LOW 20 MIN: CPT | Performed by: ADVANCED PRACTICE MIDWIFE

## 2023-03-03 RX ORDER — FAMOTIDINE 20 MG/1
20 TABLET, FILM COATED ORAL 2 TIMES DAILY
COMMUNITY

## 2023-03-03 NOTE — PROGRESS NOTES
Reason for visit: Routine OB visit at 17w3d     CC:  32-yr old  here for routine OBV at 17w3d.  BETINA 2023, by Ultrasound.  Patient reports denies VB, LOF, contractions, or other concerns. Also denies h/a, visual disturbances, and epigastric pain. She is taking Pepcid bid and Vit b6/doxylamine which is helping with her nausea.    ROS: All systems reviewed and are negative with exception of the following: nausea    Wt 175 lb for a TWG of -0.907 kg (-2 lb), /70, FHTs 133  Urine today and reviewed: negative glucose, trace protein  ffDNA screening: low risk    20-week Anatomy Scan: scheduled for 2023    Exam:  General Appearance:  Healthy appearing . Normal mood and behavior.  HEENT: NCAT, EOMI  HR str and reg. Lungs clear. Resp even and unlabored.  Abdomen is soft and nontender. Bowel sounds active. Uterus is consistent with EGA  Ext: no edema, nontender, no trauma, or cyanosis.    Impression  Diagnoses and all orders for this visit:    1. Prenatal care, subsequent pregnancy, second trimester (Primary)  - Discussed second trimester of pregnancy, discomforts and measures of support, fetal movement, pelvic pain warnings, bleeding warnings, and signs and symptoms to report. Second Trimester of Pregnancy video included in the AVS. Round Ligament Pain education included in the AVS.  - Patient is due for pap smear, but she is anxious at this time with collection. Will defer at this time and follow-up with patient at upcoming appointment.  - Reviewed ffDNA screening results.  - Discussed and encouraged to call or come to the hospital with vaginal bleeding, leaking of fluid, pelvic pain, or any other concerns.  - Return to the office in 4 weeks for a routine OB visit with Dr. Perez and as needed with concerns.    2. 17 weeks gestation of pregnancy    3. Non-smoker          This note has been signed electronically.    Hansa Cabrera, DNP, APRN, CNM, RNC-OB

## 2023-03-31 ENCOUNTER — ROUTINE PRENATAL (OUTPATIENT)
Dept: OBSTETRICS AND GYNECOLOGY | Facility: CLINIC | Age: 33
End: 2023-03-31
Payer: MEDICAID

## 2023-03-31 VITALS — WEIGHT: 181 LBS | SYSTOLIC BLOOD PRESSURE: 102 MMHG | BODY MASS INDEX: 32.06 KG/M2 | DIASTOLIC BLOOD PRESSURE: 74 MMHG

## 2023-03-31 DIAGNOSIS — O09.299 HISTORY OF PRE-ECLAMPSIA IN PRIOR PREGNANCY, CURRENTLY PREGNANT: Primary | ICD-10-CM

## 2023-03-31 LAB
GLUCOSE UR STRIP-MCNC: NEGATIVE MG/DL
PROT UR STRIP-MCNC: NEGATIVE MG/DL

## 2023-03-31 RX ORDER — PSEUDOEPHEDRINE HCL 30 MG
100 TABLET ORAL
COMMUNITY

## 2023-03-31 NOTE — PROGRESS NOTES
Good fetal movement  Taking Pepcid for reflux  Reviewed normal anatomy US, anterior low lying placenta - will recheck at 32 weeks  Hgb was 14 1/6  Discussed possible dizzy spells and ensuring adequate hydration    Diagnoses and all orders for this visit:    1. History of pre-eclampsia in prior pregnancy, currently pregnant (Primary)

## 2023-04-26 ENCOUNTER — ROUTINE PRENATAL (OUTPATIENT)
Dept: OBSTETRICS AND GYNECOLOGY | Facility: CLINIC | Age: 33
End: 2023-04-26
Payer: MEDICAID

## 2023-04-26 VITALS — SYSTOLIC BLOOD PRESSURE: 112 MMHG | BODY MASS INDEX: 32.24 KG/M2 | WEIGHT: 182 LBS | DIASTOLIC BLOOD PRESSURE: 70 MMHG

## 2023-04-26 DIAGNOSIS — Z3A.25 25 WEEKS GESTATION OF PREGNANCY: ICD-10-CM

## 2023-04-26 DIAGNOSIS — O44.40 LOW LYING PLACENTA, ANTEPARTUM: ICD-10-CM

## 2023-04-26 DIAGNOSIS — M25.559 PREGNANCY RELATED HIP PAIN, ANTEPARTUM: ICD-10-CM

## 2023-04-26 DIAGNOSIS — Z78.9 NON-SMOKER: ICD-10-CM

## 2023-04-26 DIAGNOSIS — Z34.82 MULTIGRAVIDA IN SECOND TRIMESTER: Primary | ICD-10-CM

## 2023-04-26 DIAGNOSIS — O26.899 PREGNANCY RELATED HIP PAIN, ANTEPARTUM: ICD-10-CM

## 2023-04-26 DIAGNOSIS — O09.299 HISTORY OF PRE-ECLAMPSIA IN PRIOR PREGNANCY, CURRENTLY PREGNANT: ICD-10-CM

## 2023-04-26 LAB
GLUCOSE UR STRIP-MCNC: NEGATIVE MG/DL
PROT UR STRIP-MCNC: NEGATIVE MG/DL

## 2023-04-26 NOTE — PROGRESS NOTES
Reason for visit: Routine OB visit at 25w1d      CC:  Patient reports good fetal movement. She complains of right hip or sciatic nerve pain. Denies VB, LOF, contractions, h/a, visual changes, and right upper quadrant pain.     ROS: All systems reviewed and are negative with exception of the following: right hip/sciatic nerve pain    Wt 182 lb for a TWG of 2.268 kg (5 lb), /70, FHTs 139, FH 25 cm  Urine today and reviewed: negative glucose, negative protein    20-week Anatomy scan: normal; anterior low lying placenta without evidence of placenta previa    Exam:  General Appearance:  Healthy appearing . Normal mood and behavior.  HEENT: NCAT, EOMI  HR str and reg. Lungs clear. Resp even and unlabored.  Abdomen is soft and nontender. Uterus is consistent with EGA  Ext: no edema, nontender, no trauma, or cyanosis.    Impression  Diagnoses and all orders for this visit:    1. Multigravida in second trimester (Primary)  - Discussed second trimester of pregnancy, discomforts and measures of support, fetal movement,  labor warnings, preeclampsia warnings, and signs and symptoms to report. Second Trimester of Pregnancy video and Round Ligament Pain education included in the AVS.  - Discussed plan for glucose tolerance test and hemoglobin for the next visit.  - Discussed and encouraged to call or come to the hospital for vaginal bleeding, leaking of fluid, contractions, or any other concerns.  - Return to the office in three weeks with Dr. Perez with 4D ultrasound and as needed with concerns.    2. 25 weeks gestation of pregnancy    3. Pregnancy related hip pain, antepartum  - Discussed measures of support, including heat, massage, chiropractor care, maternity support belt, good body mechanics, Tylenol, OMT consultation, physical therapy consultation. Desires referral for physical therapy for hip/sciatic nerve pain and support/education prior to labor.  -     Ambulatory Referral to Physical Therapy  Pelvic Floor    4. Low lying placenta, antepartum  - Plan for repeat ultrasound assessment at 32-weeks gestation.    5. History of pre-eclampsia in prior pregnancy, currently pregnant    6. Non-smoker          This note has been signed electronically.    Hansa Cabrera, DNP, APRN, CNM, RNC-OB

## 2023-04-30 PROBLEM — O99.210 OBESITY AFFECTING PREGNANCY: Status: ACTIVE | Noted: 2023-03-23

## 2023-04-30 PROBLEM — O44.40 LOW-LYING PLACENTA: Status: ACTIVE | Noted: 2023-03-23

## 2023-04-30 PROBLEM — O09.299 HISTORY OF PRE-ECLAMPSIA IN PRIOR PREGNANCY, CURRENTLY PREGNANT: Status: ACTIVE | Noted: 2023-04-30

## 2023-04-30 PROBLEM — Z64.1 MULTIGRAVIDA: Status: ACTIVE | Noted: 2023-04-30

## 2023-05-02 ENCOUNTER — TREATMENT (OUTPATIENT)
Dept: PHYSICAL THERAPY | Facility: CLINIC | Age: 33
End: 2023-05-02
Payer: MEDICAID

## 2023-05-02 DIAGNOSIS — M25.552 LEFT HIP PAIN: ICD-10-CM

## 2023-05-02 DIAGNOSIS — M54.41 ACUTE BILATERAL LOW BACK PAIN WITH RIGHT-SIDED SCIATICA: Primary | ICD-10-CM

## 2023-05-02 PROCEDURE — 97140 MANUAL THERAPY 1/> REGIONS: CPT | Performed by: PHYSICAL THERAPIST

## 2023-05-02 PROCEDURE — 97162 PT EVAL MOD COMPLEX 30 MIN: CPT | Performed by: PHYSICAL THERAPIST

## 2023-05-02 NOTE — PROGRESS NOTES
Physical Therapy Initial Evaluation and Plan of Care  [address] [place] [address]    Patient: Dania Avina               : 1990  Visit Date: 2023  Referring practitioner: YOVANY Sanford  Date of Initial Visit: 2023  Patient seen for 1 sessions    Visit Diagnoses:    ICD-10-CM ICD-9-CM   1. Acute bilateral low back pain with right-sided sciatica  M54.41 724.2     724.3     338.19   2. Left hip pain  M25.552 719.45     Past Medical History:   Diagnosis Date   • Anemia    • Anxiety    • Depression    • Gallstone    • Gestational hypertension 2011    During labor   • Migraine    • PMS (premenstrual syndrome)    • Preeclampsia    •  labor    • Tremor    • Varicella      Past Surgical History:   Procedure Laterality Date   • CHOLECYSTECTOMY WITH INTRAOPERATIVE CHOLANGIOGRAM N/A 10/01/2020    Procedure: LAPAROSCOPIC CHOLECYSTECTOMY;  Surgeon: Heather Glass MD;  Location: [facility];  Service: General;  Laterality: N/A;   • WISDOM TOOTH EXTRACTION           SUBJECTIVE     Subjective She is 26 weeks pregnant (5 boys blended), with girl. She started having RLE back pain a few weeks ago and now L hip pain that feels different. Her pain can vary during the day. Sometimes getting out of van, bending, stairs will hurt. One time her  had to help move her. She did have horrible morning sickness. Sleeping with multiple pillows to help, but does have disrupted sleep.     Did not have with first pregnancy (12 years ago), about 9 years ago fell down stairs and hit on bottom. Since then she will get muscle spasms she has to lay down and rest.       Outcome Measure:   ALLIE:   PT G-Codes  Outcome Measure Options: Modified Oswestry  Modified Oswestry Score/Comments:     OBJECTIVE     Objective   SLS: R WFL (but immediate hip drop) L: WFL with hip drop and uncomfortable   MMT Hip abduction: R: 4-/5 L: did not assess  Limited  hip ROM with crossing leg over   TTP: R>L lumbar mike and L>R gluts/piriformis    Manual Therapy     26930  Comments   (L) SL STM to gluts, piriformis (B) lumbar mike with tennis ball and then (R) ITB/HS/Quads with blue faom                    Timed Minutes 10        Therapy Education/Self Care 86559   Education offered today Use of TrA with positional changes, stretches, resting   [employer] Code [email or web address]   Ongoing HEP   Access Code: [email or web address]  URL: [email or web address]  Date: 05/02/2023  Prepared by: Pia Paulson    Exercises  - Seated Piriformis Stretch  - 1 x daily - 3 sets - 30 seconds hold  - Seated Transversus Abdominis Bracing  - 1-2 x daily - 1 sets - 3-5 reps   Timed Minutes 10       Total Timed Treatment:     20   mins  Total Time of Visit:            55   mins    ASSESSMENT/PLAN     GOALS:  Goals                                                  Progress Note due by 6/1/23                                                              Recert due by 7/30/23   STG by: 6 weeks Comments Date Status          Improve  bilateral passive hip IR to 25 deg or greater        Improve  bilateral passive hip ER to 45 deg or greater                LTG by: 12 weeks       Reports no radicular symptoms in left LE for a week.       Able to get out of van and complete stairs without exacerbation of pain       SLS either leg x 30 secs or greater with good hip stability       Reports pain no greater than 1-2/10 when it does occur.       Improve Modified Oswestry to 5 or less      Independent with HEP for flexibility and core/hip stability, education on delivery and post partum         Assessment & Plan     Assessment  Impairments: abnormal or restricted ROM, activity intolerance, impaired balance, impaired physical strength, lacks appropriate home exercise program and pain with function  Functional Limitations: lifting, sleeping, walking, pulling, pushing, uncomfortable because of pain, moving in bed, sitting,  standing, stooping and unable to perform repetitive tasks  Assessment details: Dania presents 26 weeks pregnant with low back RLE pain and L hip pain. She is limited with transitional movements, caring for kids and completing daily activities. She has decreased hip ROM, core/hip stability and surrounding soft tissue restrictions. Skilled PT needed to address above restrictions and progress with strengthening and preparing for labor/delievery and post partum.   Prognosis: good    Plan  Therapy options: will be seen for skilled therapy services  Other planned modality interventions: no modalities during pregnancy  Planned therapy interventions: abdominal trunk stabilization, ADL retraining, balance/weight-bearing training, body mechanics training, flexibility, functional ROM exercises, gait training, home exercise program, IADL retraining, joint mobilization, transfer training, therapeutic activities, stretching, strengthening, spinal/joint mobilization, soft tissue mobilization, postural training, neuromuscular re-education, motor coordination training and manual therapy  Frequency: 1x week  Duration in weeks: 12  Treatment plan discussed with: patient  Plan details: Initially we will work on hip ROM and surrounding soft tissue restrictions. We will progress with core/hip stability, including preparing for labor/delivery and educating on post partum.         SIGNATURE: Pia Paulson, PT DPT, [place] License #: [id number]  Electronically Signed on 5/2/2023      Initial Certification  Certification Period: 5/2/2023 through 7/30/2023  I certify that the therapy services are furnished while this patient is under my care.  The services outlined above are required by this patient, and will be reviewed every 90 days.     PHYSICIAN: Hansa Cabrera APRN (NPI: [id number])    Signature____________________________________________DATE: _________     Please sign and return via fax to [phone].   Thank you so much for letting us  work with Dania. I appreciate your letting us work with your patients. If you have any questions or concerns, please don't hesitate to contact me.          [address] [address]  [phone]

## 2023-05-15 ENCOUNTER — PATIENT OUTREACH (OUTPATIENT)
Dept: LABOR AND DELIVERY | Facility: HOSPITAL | Age: 33
End: 2023-05-15
Payer: MEDICAID

## 2023-05-15 NOTE — OUTREACH NOTE
[facility]  Check-In    Current Estimated Gestational Age: 27w6d      Questions/Answers      Flowsheet Row Responses   Best Method for Contacting Cell   Able to keep appointments as scheduled Yes   Gender(s) and Name(s) female   Baby Active/Feeling Fetal Movemen Yes   How are you presently feeling? voices no complaints   Special Considerations Birthing Ball, Peanut Ball   Supplies ready for baby Car Seat, Clothing, Crib, Diapers, Feeding Supplies   Resource/Environmental Concerns None   Do you have any questions related to your care experience, your pregnancy, plans for delivery, any concerns, etc? No            Resource letter and maternal warning signs sent to client in her mychart.       Desire Dove RN  Maternity Nurse Navigator    5/15/2023, 11:01 CDT

## 2023-05-16 ENCOUNTER — ROUTINE PRENATAL (OUTPATIENT)
Dept: OBSTETRICS AND GYNECOLOGY | Facility: CLINIC | Age: 33
End: 2023-05-16
Payer: MEDICAID

## 2023-05-16 VITALS — DIASTOLIC BLOOD PRESSURE: 82 MMHG | WEIGHT: 192 LBS | BODY MASS INDEX: 34.01 KG/M2 | SYSTOLIC BLOOD PRESSURE: 124 MMHG

## 2023-05-16 DIAGNOSIS — Z34.83 SUPERVISION OF NORMAL INTRAUTERINE PREGNANCY IN MULTIGRAVIDA IN THIRD TRIMESTER: Primary | ICD-10-CM

## 2023-05-16 DIAGNOSIS — O44.40 LOW LYING PLACENTA, ANTEPARTUM: ICD-10-CM

## 2023-05-16 DIAGNOSIS — N64.59 BREAST ENGORGEMENT: ICD-10-CM

## 2023-05-16 LAB
GLUCOSE UR STRIP-MCNC: NEGATIVE MG/DL
PROT UR STRIP-MCNC: NEGATIVE MG/DL

## 2023-05-16 RX ORDER — BREAST PUMP
1 EACH MISCELLANEOUS AS NEEDED
Qty: 1 EACH | Refills: 0 | Status: SHIPPED | OUTPATIENT
Start: 2023-05-16

## 2023-05-16 NOTE — PROGRESS NOTES
Good fetal movement  Plan follow up US at 32 weeks  Glucola and Hgb today, Rh positive  Discuss Tdap next visit  Discussed [place] contractions    Diagnoses and all orders for this visit:    1. Supervision of normal intrauterine pregnancy in multigravida in third trimester (Primary)  -     Gestational Screen 1 Hr ([facility])  -     Hemoglobin    2. Low lying placenta, antepartum

## 2023-05-17 LAB
GLUCOSE 1H P 50 G GLC PO SERPL-MCNC: 107 MG/DL (ref 70–139)
HGB BLD-MCNC: 12.5 G/DL (ref 11.1–15.9)

## 2023-06-09 ENCOUNTER — ROUTINE PRENATAL (OUTPATIENT)
Dept: OBSTETRICS AND GYNECOLOGY | Facility: CLINIC | Age: 33
End: 2023-06-09
Payer: MEDICAID

## 2023-06-09 VITALS — SYSTOLIC BLOOD PRESSURE: 104 MMHG | WEIGHT: 192 LBS | BODY MASS INDEX: 34.01 KG/M2 | DIASTOLIC BLOOD PRESSURE: 82 MMHG

## 2023-06-09 DIAGNOSIS — O44.40 LOW LYING PLACENTA, ANTEPARTUM: Primary | ICD-10-CM

## 2023-06-15 ENCOUNTER — TREATMENT (OUTPATIENT)
Dept: PHYSICAL THERAPY | Facility: CLINIC | Age: 33
End: 2023-06-15
Payer: MEDICAID

## 2023-06-15 DIAGNOSIS — M25.552 LEFT HIP PAIN: ICD-10-CM

## 2023-06-15 DIAGNOSIS — M54.41 ACUTE BILATERAL LOW BACK PAIN WITH RIGHT-SIDED SCIATICA: Primary | ICD-10-CM

## 2023-06-15 PROCEDURE — 97140 MANUAL THERAPY 1/> REGIONS: CPT | Performed by: PHYSICAL THERAPIST

## 2023-06-15 PROCEDURE — 97535 SELF CARE MNGMENT TRAINING: CPT | Performed by: PHYSICAL THERAPIST

## 2023-06-15 NOTE — PROGRESS NOTES
Physical Therapy Treatment Note and 30 Day Progress Note  [address] [place] [address]    Patient: Dania Avina                                                 Visit Date: 6/15/2023  :     1990    Referring practitioner:    No ref. provider found  Date of Initial Visit:          Type: THERAPY  Noted: 2023    Patient seen for 2 sessions    Visit Diagnoses:    ICD-10-CM ICD-9-CM   1. Acute bilateral low back pain with right-sided sciatica  M54.41 724.2     724.3     338.19   2. Left hip pain  M25.552 719.45     SUBJECTIVE     Subjective:The sciatica hasn't been as bad, but remington horses have been horrible, R>L recently. Mostly occurs when laying down, but now happened with walking the other day. She will have ongoing soreness after. She is getting 60-90 oz or more of water. She is working on breathing, not as much with stretching. Still crossing her legs is difficult. Goes for US tomorrow and will get measurements.       PAIN: did not rate/10         OBJECTIVE     Objective      Manual Therapy     52169  Comments   L SL STM to lumbar mike, R>L gluts, R ITB                    Timed Minutes 29       Therapy Education/Self Care 31821   Education offered today Use of TrA with positional changes, stretches, resting   [facility] Code [email or web address]   Ongoing HEP   Access Code: [email or web address]  URL: [email or web address]  Date: 06/15/2023  Prepared by: Pia Paulson    Exercises  - Seated Piriformis Stretch  - 1 x daily - 3 sets - 30 seconds hold  - Seated Transversus Abdominis Bracing  - 1-2 x daily - 1 sets - 3-5 reps  - Standing Hip Abduction with Counter Support  - 3 x weekly - 2 sets - 10-15 reps  - Mini Squat with Counter Support  - 3 x weekly - 2 sets - 10 reps   Timed Minutes 10        Total Timed Treatment:     39   mins  Total Time of Visit:             39   mins         ASSESSMENT/PLAN     GOALS        Goals                                                   Progress Note due by 7/14/23                                                              Recert due by 7/30/23   STG by: 6 weeks Comments Date Status             Improve  bilateral passive hip IR to 25 deg or greater       ongoing   Improve  bilateral passive hip ER to 45 deg or greater       ongoing             LTG by: 12 weeks         Reports no radicular symptoms in left LE for a week.  improved  6/15  ongoing   Able to get out of van and complete stairs without exacerbation of pain      ongoing   SLS either leg x 30 secs or greater with good hip stability      ongoing   Reports pain no greater than 1-2/10 when it does occur.      ongoing   Improve Modified Oswestry to 5 or less      ongoing   Independent with HEP for flexibility and core/hip stability, education on delivery and post partum      ongoing        Assessment & Plan     Assessment  Impairments: abnormal or restricted ROM, activity intolerance, impaired balance, impaired physical strength, lacks appropriate home exercise program and pain with function  Functional Limitations: lifting, sleeping, walking, pulling, pushing, uncomfortable because of pain, moving in bed, sitting, standing, stooping and unable to perform repetitive tasksPrognosis: good    Plan  Therapy options: will be seen for skilled therapy services  Other planned modality interventions: no modalities during pregnancy  Planned therapy interventions: abdominal trunk stabilization, ADL retraining, balance/weight-bearing training, body mechanics training, flexibility, functional ROM exercises, gait training, home exercise program, IADL retraining, joint mobilization, transfer training, therapeutic activities, stretching, strengthening, spinal/joint mobilization, soft tissue mobilization, postural training, neuromuscular re-education, motor coordination training and manual therapy  Frequency: 1x week  Duration in weeks: 12  Treatment plan discussed with: patient        ASSESSMENT:   She is now 32 weeks pregnant, her sciatica has improved, but has ongoing back pain and dealing with muscle cramps that increase soreness.     PLAN:   Continue to work on soft tissue restrictions. Progress with postural strengthening and educate on labor/delivery.     SIGNATURE: Pia Paulson, PT DPT, [place] License #: [id number]  Electronically Signed on 6/15/2023        [address] [place] [address]. [address]  [phone]

## 2023-06-16 ENCOUNTER — ROUTINE PRENATAL (OUTPATIENT)
Dept: OBSTETRICS AND GYNECOLOGY | Facility: CLINIC | Age: 33
End: 2023-06-16
Payer: MEDICAID

## 2023-06-16 VITALS — DIASTOLIC BLOOD PRESSURE: 84 MMHG | BODY MASS INDEX: 34.72 KG/M2 | SYSTOLIC BLOOD PRESSURE: 108 MMHG | WEIGHT: 196 LBS

## 2023-06-16 DIAGNOSIS — Z34.83 SUPERVISION OF NORMAL INTRAUTERINE PREGNANCY IN MULTIGRAVIDA IN THIRD TRIMESTER: Primary | ICD-10-CM

## 2023-06-19 ENCOUNTER — TREATMENT (OUTPATIENT)
Dept: PHYSICAL THERAPY | Facility: CLINIC | Age: 33
End: 2023-06-19
Payer: MEDICAID

## 2023-06-19 DIAGNOSIS — M25.552 LEFT HIP PAIN: ICD-10-CM

## 2023-06-19 DIAGNOSIS — M54.41 ACUTE BILATERAL LOW BACK PAIN WITH RIGHT-SIDED SCIATICA: Primary | ICD-10-CM

## 2023-06-19 PROCEDURE — 97535 SELF CARE MNGMENT TRAINING: CPT | Performed by: PHYSICAL THERAPIST

## 2023-06-19 PROCEDURE — 97140 MANUAL THERAPY 1/> REGIONS: CPT | Performed by: PHYSICAL THERAPIST

## 2023-06-19 NOTE — PROGRESS NOTES
Physical Therapy Treatment Note and 30 Day Progress Note  [address] [place] [address]    Patient: Dania Avina                                                 Visit Date: 2023  :     1990    Referring practitioner:    YOVANY Sanford  Date of Initial Visit:          Type: THERAPY  Noted: 2023    Patient seen for 3 sessions    Visit Diagnoses:    ICD-10-CM ICD-9-CM   1. Acute bilateral low back pain with right-sided sciatica  M54.41 724.2     724.3     338.19   2. Left hip pain  M25.552 719.45     SUBJECTIVE     Subjective:No more remington horses. Her calf has been hurting, but eased up some. She does have some R buttock/low back pain and down RLE with sitting prolonged. She had to readjust with sitting for meal.           PAIN: did not rate/10         OBJECTIVE     Objective      Manual Therapy     13569  Comments   L SL STM to lumbar mike, R>L gluts, R ITB        KT tape for abdominal support            Timed Minutes 28       Therapy Education/Self Care 81731   Education offered today Tape wear, symptoms    [facility] Code [email or web address]   Ongoing HEP   Access Code: [email or web address]  URL: [email or web address]  Date: 06/15/2023  Prepared by: Pia Paulson    Exercises  - Seated Piriformis Stretch  - 1 x daily - 3 sets - 30 seconds hold  - Seated Transversus Abdominis Bracing  - 1-2 x daily - 1 sets - 3-5 reps  - Standing Hip Abduction with Counter Support  - 3 x weekly - 2 sets - 10-15 reps  - Mini Squat with Counter Support  - 3 x weekly - 2 sets - 10 reps   Timed Minutes 10        Total Timed Treatment:     38   mins  Total Time of Visit:             38   mins         ASSESSMENT/PLAN     GOALS        Goals                                                  Progress Note due by 23                                                              Recert due by 23   STG by: 6 weeks Comments Date Status             Improve   bilateral passive hip IR to 25 deg or greater       ongoing   Improve  bilateral passive hip ER to 45 deg or greater       ongoing             LTG by: 12 weeks         Reports no radicular symptoms in left LE for a week.  slight increase from last visit 6/19  ongoing   Able to get out of van and complete stairs without exacerbation of pain      ongoing   SLS either leg x 30 secs or greater with good hip stability      ongoing   Reports pain no greater than 1-2/10 when it does occur.      ongoing   Improve Modified Oswestry to 5 or less      ongoing   Independent with HEP for flexibility and core/hip stability, education on delivery and post partum      ongoing        Assessment/Plan     ASSESSMENT:   She is now  almost 33 weeks pregnant, her sciatica has increased slightly today. She does still have decreased hip ROM, hip and core stability.   PLAN:   Continue to work on soft tissue restrictions.Work on hip ROM.  Progress with postural strengthening and educate on labor/delivery. Update HEP.     SIGNATURE: Pia Paulson, PT DPT, [place] License #: [id number]  Electronically Signed on 6/19/2023        [address] [place] [address] [place]. [address]  [phone]

## 2023-06-26 PROBLEM — O44.40 LOW-LYING PLACENTA: Status: RESOLVED | Noted: 2023-03-23 | Resolved: 2023-06-26

## 2023-07-22 ENCOUNTER — HOSPITAL ENCOUNTER (INPATIENT)
Facility: HOSPITAL | Age: 33
LOS: 5 days | Discharge: HOME | End: 2023-07-27
Attending: OBSTETRICS & GYNECOLOGY | Admitting: OBSTETRICS & GYNECOLOGY
Payer: MEDICAID

## 2023-07-22 DIAGNOSIS — O13.3 GESTATIONAL HYPERTENSION, THIRD TRIMESTER: Primary | ICD-10-CM

## 2023-07-22 PROBLEM — O47.9 THREATENED LABOR: Status: ACTIVE | Noted: 2023-07-22

## 2023-07-22 LAB
ALBUMIN SERPL-MCNC: 3.7 G/DL (ref 3.5–5.2)
ALBUMIN/GLOB SERPL: 1.3 G/DL
ALP SERPL-CCNC: 109 U/L (ref 39–117)
ALT SERPL W P-5'-P-CCNC: 16 U/L (ref 1–33)
ANION GAP SERPL CALCULATED.3IONS-SCNC: 13 MMOL/L (ref 5–15)
AST SERPL-CCNC: 17 U/L (ref 1–32)
BASOPHILS # BLD AUTO: 0.06 10*3/MM3 (ref 0–0.2)
BASOPHILS NFR BLD AUTO: 0.6 % (ref 0–1.5)
BILIRUB SERPL-MCNC: <0.2 MG/DL (ref 0–1.2)
BUN SERPL-MCNC: 7 MG/DL (ref 6–20)
BUN/CREAT SERPL: 11.5 (ref 7–25)
CALCIUM SPEC-SCNC: 9.1 MG/DL (ref 8.6–10.5)
CHLORIDE SERPL-SCNC: 105 MMOL/L (ref 98–107)
CO2 SERPL-SCNC: 20 MMOL/L (ref 22–29)
CREAT SERPL-MCNC: 0.61 MG/DL (ref 0.57–1)
DEPRECATED RDW RBC AUTO: 46.4 FL (ref 37–54)
EGFRCR SERPLBLD CKD-EPI 2021: 120.5 ML/MIN/1.73
EOSINOPHIL # BLD AUTO: 0.11 10*3/MM3 (ref 0–0.4)
EOSINOPHIL NFR BLD AUTO: 1.1 % (ref 0.3–6.2)
ERYTHROCYTE [DISTWIDTH] IN BLOOD BY AUTOMATED COUNT: 13.8 % (ref 12.3–15.4)
EXPIRATION DATE: ABNORMAL
GLOBULIN UR ELPH-MCNC: 2.8 GM/DL
GLUCOSE SERPL-MCNC: 99 MG/DL (ref 65–99)
HCT VFR BLD AUTO: 34 % (ref 34–46.6)
HGB BLD-MCNC: 10.8 G/DL (ref 12–15.9)
IMM GRANULOCYTES # BLD AUTO: 0.04 10*3/MM3 (ref 0–0.05)
IMM GRANULOCYTES NFR BLD AUTO: 0.4 % (ref 0–0.5)
LYMPHOCYTES # BLD AUTO: 2.5 10*3/MM3 (ref 0.7–3.1)
LYMPHOCYTES NFR BLD AUTO: 23.9 % (ref 19.6–45.3)
Lab: ABNORMAL
MCH RBC QN AUTO: 29.4 PG (ref 26.6–33)
MCHC RBC AUTO-ENTMCNC: 31.8 G/DL (ref 31.5–35.7)
MCV RBC AUTO: 92.6 FL (ref 79–97)
MONOCYTES # BLD AUTO: 0.79 10*3/MM3 (ref 0.1–0.9)
MONOCYTES NFR BLD AUTO: 7.6 % (ref 5–12)
NEUTROPHILS NFR BLD AUTO: 6.95 10*3/MM3 (ref 1.7–7)
NEUTROPHILS NFR BLD AUTO: 66.4 % (ref 42.7–76)
NRBC BLD AUTO-RTO: 0 /100 WBC (ref 0–0.2)
PLATELET # BLD AUTO: 194 10*3/MM3 (ref 140–450)
PMV BLD AUTO: 12 FL (ref 6–12)
POTASSIUM SERPL-SCNC: 3.5 MMOL/L (ref 3.5–5.2)
PROT SERPL-MCNC: 6.5 G/DL (ref 6–8.5)
PROT UR STRIP-MCNC: ABNORMAL MG/DL
RBC # BLD AUTO: 3.67 10*6/MM3 (ref 3.77–5.28)
SODIUM SERPL-SCNC: 138 MMOL/L (ref 136–145)
URATE SERPL-MCNC: 3.9 MG/DL (ref 2.4–5.7)
WBC NRBC COR # BLD: 10.45 10*3/MM3 (ref 3.4–10.8)

## 2023-07-22 PROCEDURE — 84550 ASSAY OF BLOOD/URIC ACID: CPT | Performed by: OBSTETRICS & GYNECOLOGY

## 2023-07-22 PROCEDURE — 86901 BLOOD TYPING SEROLOGIC RH(D): CPT | Performed by: OBSTETRICS & GYNECOLOGY

## 2023-07-22 PROCEDURE — 85025 COMPLETE CBC W/AUTO DIFF WBC: CPT | Performed by: OBSTETRICS & GYNECOLOGY

## 2023-07-22 PROCEDURE — 86900 BLOOD TYPING SEROLOGIC ABO: CPT | Performed by: OBSTETRICS & GYNECOLOGY

## 2023-07-22 PROCEDURE — 80053 COMPREHEN METABOLIC PANEL: CPT | Performed by: OBSTETRICS & GYNECOLOGY

## 2023-07-22 PROCEDURE — 36415 COLL VENOUS BLD VENIPUNCTURE: CPT | Performed by: OBSTETRICS & GYNECOLOGY

## 2023-07-22 PROCEDURE — 3E033VJ INTRODUCTION OF OTHER HORMONE INTO PERIPHERAL VEIN, PERCUTANEOUS APPROACH: ICD-10-PCS | Performed by: OBSTETRICS & GYNECOLOGY

## 2023-07-22 PROCEDURE — 81002 URINALYSIS NONAUTO W/O SCOPE: CPT | Performed by: OBSTETRICS & GYNECOLOGY

## 2023-07-22 PROCEDURE — 86850 RBC ANTIBODY SCREEN: CPT | Performed by: OBSTETRICS & GYNECOLOGY

## 2023-07-22 RX ORDER — ACETAMINOPHEN 325 MG/1
650 TABLET ORAL EVERY 4 HOURS PRN
Status: DISCONTINUED | OUTPATIENT
Start: 2023-07-22 | End: 2023-07-25 | Stop reason: HOSPADM

## 2023-07-22 RX ORDER — MORPHINE SULFATE 10 MG/ML
10 INJECTION INTRAMUSCULAR; INTRAVENOUS; SUBCUTANEOUS
Status: DISCONTINUED | OUTPATIENT
Start: 2023-07-22 | End: 2023-07-25 | Stop reason: HOSPADM

## 2023-07-22 RX ORDER — OXYTOCIN/0.9 % SODIUM CHLORIDE 30/500 ML
2-30 PLASTIC BAG, INJECTION (ML) INTRAVENOUS
Status: DISCONTINUED | OUTPATIENT
Start: 2023-07-23 | End: 2023-07-25

## 2023-07-22 RX ORDER — ONDANSETRON 2 MG/ML
4 INJECTION INTRAMUSCULAR; INTRAVENOUS EVERY 6 HOURS PRN
Status: DISCONTINUED | OUTPATIENT
Start: 2023-07-22 | End: 2023-07-25 | Stop reason: HOSPADM

## 2023-07-22 RX ORDER — SODIUM CHLORIDE, SODIUM LACTATE, POTASSIUM CHLORIDE, CALCIUM CHLORIDE 600; 310; 30; 20 MG/100ML; MG/100ML; MG/100ML; MG/100ML
125 INJECTION, SOLUTION INTRAVENOUS CONTINUOUS
Status: DISCONTINUED | OUTPATIENT
Start: 2023-07-23 | End: 2023-07-25

## 2023-07-22 RX ORDER — TERBUTALINE SULFATE 1 MG/ML
0.25 INJECTION, SOLUTION SUBCUTANEOUS AS NEEDED
Status: DISCONTINUED | OUTPATIENT
Start: 2023-07-22 | End: 2023-07-25 | Stop reason: HOSPADM

## 2023-07-22 RX ORDER — PROMETHAZINE HYDROCHLORIDE 25 MG/1
12.5 TABLET ORAL EVERY 6 HOURS PRN
Status: DISCONTINUED | OUTPATIENT
Start: 2023-07-22 | End: 2023-07-25

## 2023-07-22 RX ORDER — SODIUM CHLORIDE 0.9 % (FLUSH) 0.9 %
10 SYRINGE (ML) INJECTION EVERY 12 HOURS SCHEDULED
Status: DISCONTINUED | OUTPATIENT
Start: 2023-07-22 | End: 2023-07-25

## 2023-07-22 RX ORDER — SODIUM CHLORIDE 0.9 % (FLUSH) 0.9 %
10 SYRINGE (ML) INJECTION AS NEEDED
Status: DISCONTINUED | OUTPATIENT
Start: 2023-07-22 | End: 2023-07-25

## 2023-07-22 RX ORDER — PROMETHAZINE HYDROCHLORIDE 12.5 MG/1
12.5 SUPPOSITORY RECTAL EVERY 6 HOURS PRN
Status: DISCONTINUED | OUTPATIENT
Start: 2023-07-22 | End: 2023-07-25

## 2023-07-22 RX ORDER — ONDANSETRON 4 MG/1
4 TABLET, FILM COATED ORAL EVERY 6 HOURS PRN
Status: DISCONTINUED | OUTPATIENT
Start: 2023-07-22 | End: 2023-07-25 | Stop reason: HOSPADM

## 2023-07-22 RX ADMIN — SODIUM CHLORIDE, POTASSIUM CHLORIDE, SODIUM LACTATE AND CALCIUM CHLORIDE 125 ML/HR: 600; 310; 30; 20 INJECTION, SOLUTION INTRAVENOUS at 23:37

## 2023-07-22 RX ADMIN — Medication 2 MILLI-UNITS/MIN: at 23:42

## 2023-07-23 LAB
ABO GROUP BLD: NORMAL
BLD GP AB SCN SERPL QL: NEGATIVE
RH BLD: POSITIVE
T&S EXPIRATION DATE: NORMAL

## 2023-07-23 PROCEDURE — S0260 H&P FOR SURGERY: HCPCS | Performed by: OBSTETRICS & GYNECOLOGY

## 2023-07-23 RX ORDER — MISOPROSTOL 100 MCG
25 TABLET ORAL EVERY 4 HOURS
Status: COMPLETED | OUTPATIENT
Start: 2023-07-23 | End: 2023-07-24

## 2023-07-23 RX ADMIN — SODIUM CHLORIDE, POTASSIUM CHLORIDE, SODIUM LACTATE AND CALCIUM CHLORIDE 125 ML/HR: 600; 310; 30; 20 INJECTION, SOLUTION INTRAVENOUS at 07:33

## 2023-07-23 RX ADMIN — Medication 25 MCG: at 21:25

## 2023-07-23 RX ADMIN — SODIUM CHLORIDE, POTASSIUM CHLORIDE, SODIUM LACTATE AND CALCIUM CHLORIDE 125 ML/HR: 600; 310; 30; 20 INJECTION, SOLUTION INTRAVENOUS at 17:00

## 2023-07-23 NOTE — PROGRESS NOTES
Reagan Avina  : 1990  MRN: [id number]  CSN: [id number]    Labor progress note    Subjective   Patient currently reports is feeling painful contractions, but has not made any cervical change.  Dania has walked the guy or been on the birthing ball all day, but is still only fingertip.  No LOF or VB.     Objective   Min/max vitals past 24 hours:  Temp  Min: 98 °F (36.7 °C)  Max: 98.7 °F (37.1 °C)   BP  Min: 104/70  Max: 154/105   Pulse  Min: 72  Max: 104   Resp  Min: 16  Max: 18        FHT's: reactive, reassuring  130 + accels, no decels, moderate variability   Cervix: was not checked. But she was recently checked by RN   Contractions: regular every 2 minutes          Assessment   IUP at 37w5d with preeclampsia  GBS negative  Fetus reassuring  IOL not progressing at all, despite contractions.  Patient has been on 20 miUnits of pitocin for most of the day     Plan   Turning off pitocin  Give patient a break for 3-4 hours and allow her to eat dinner  Will try cytotec tondmitry Carty MD  2023  17:37 CDT

## 2023-07-23 NOTE — H&P
[facility]  Dania Avina  : 1990  MRN: [id number]  CSN: [id number]    History and Physical    Subjective   Dania Avina is a 33 y.o. year old  with an Estimated Date of Delivery: 23 currently at 37w5d presenting with headache and increased swelling and 2 beats of clonus .  Patient had preeclampsia with first pregnancy.  She is a nurse and knew the signs, so took her BP and it was elevated.  Labs on admission were normal, but she qualifies for diagnosis of preeclampsia based on BP and symptoms.  Highest BP since admission has been 135/103 and 146/96.    Prenatal care has been with Dr. Zeeshan Perez.  It has been benign.    OB History    Para Term  AB Living   2 1 1 0 0 1   SAB IAB Ectopic Molar Multiple Live Births   0 0 0 0 0 1      # Outcome Date GA Lbr Nino/2nd Weight Sex Delivery Anes PTL Lv   2 Current            1 Term 2011 39w0d  3289 g (7 lb 4 oz) M Vag-Spont EPI  RADHA      Complications: Pre-eclampsia     Past Medical History:   Diagnosis Date    Anemia     Anxiety     Depression     Gallstone     Gestational hypertension 2011    During labor    Migraine     PMS (premenstrual syndrome)     Preeclampsia      labor     Tremor     Varicella      Past Surgical History:   Procedure Laterality Date    CHOLECYSTECTOMY WITH INTRAOPERATIVE CHOLANGIOGRAM N/A 10/01/2020    Procedure: LAPAROSCOPIC CHOLECYSTECTOMY;  Surgeon: Heather Glass MD;  Location: [facility];  Service: General;  Laterality: N/A;    WISDOM TOOTH EXTRACTION         Current Facility-Administered Medications:     acetaminophen (TYLENOL) tablet 650 mg, 650 mg, Oral, Q4H PRN, Emi Carty MD    lactated ringers bolus 1,000 mL, 1,000 mL, Intravenous, Once, Emi Carty MD    lactated ringers infusion, 125 mL/hr, Intravenous, Continuous, Emi Carty MD, Last Rate: 125 mL/hr at 23 2337, 125 mL/hr at 23 233    Morphine injection 10 mg, 10 mg, Subcutaneous, Q2H PRN, Emi Carty MD     "ondansetron (ZOFRAN) tablet 4 mg, 4 mg, Oral, Q6H PRN **OR** ondansetron (ZOFRAN) injection 4 mg, 4 mg, Intravenous, Q6H PRN, Emi Carty MD    oxytocin (PITOCIN) 30 units in 0.9% sodium chloride 500 mL (premix), 2-30 justin-units/min, Intravenous, Titrated, Emi Carty MD, Last Rate: 6 mL/hr at 07/23/23 0100, 6 justin-units/min at 07/23/23 0100    promethazine (PHENERGAN) suppository 12.5 mg, 12.5 mg, Rectal, Q6H PRN **OR** promethazine (PHENERGAN) tablet 12.5 mg, 12.5 mg, Oral, Q6H PRN, Emi Carty MD    sodium chloride 0.9 % flush 10 mL, 10 mL, Intravenous, Q12H, Emory Chan MD    sodium chloride 0.9 % flush 10 mL, 10 mL, Intravenous, PRN, Emory Chan MD    terbutaline (BRETHINE) injection 0.25 mg, 0.25 mg, Subcutaneous, PRN, Emi Carty MD    Allergies   Allergen Reactions    Phenergan [Promethazine Hcl] Nausea And Vomiting    Wasp Venom Swelling     swelling     Social History    Tobacco Use      Smoking status: Never      Smokeless tobacco: Never    Review of Systems      Objective   /96   Pulse 92   Temp 98.2 °F (36.8 °C)   Resp 16   Ht 160 cm (63\")   Wt 93.9 kg (207 lb)   LMP 01/28/2022   SpO2 97%   Breastfeeding Yes   BMI 36.67 kg/m²   General: well developed; well nourished  no acute distress   Heart: Not performed.   Lungs: breathing is unlabored   Abdomen: soft, non-tender; no masses  fundus firm and non-tender   FHT's: reactive, reassuring  120 + accels, no decels, moderate variability   Cervix: was checked (by RN): finger tip/ 50 % / -3   Presentation: cephalic   Contractions:  EFW by Leopold's:  EFW by recent u/s: irregular    AFW 60% four weeks ago       Prenatal Labs  Lab Results   Component Value Date    HGB 10.8 (L) 07/22/2023    HEPBSAG Negative 01/06/2023    ABO B 07/22/2023    RH Positive 07/22/2023    ABSCRN Negative 07/22/2023    EFX5GTE5 Non Reactive 01/06/2023    HEPCVIRUSABY <0.1 01/06/2023    URINECX Final report 01/06/2023       Current Labs " Reviewed   CBC, CMP, and uric acid       Assessment   IUP at 37w5d with preeclampsia  Fetus reassuring and reactive  Group B strep status: negative     Plan   IOL with low-dose pitocin    Emi Carty MD  7/23/2023  03:24 CDT

## 2023-07-23 NOTE — PROGRESS NOTES
Reagan Avina  : 1990  MRN: [id number]  CSN: [id number]    Labor progress note    Subjective   Patient currently reports is feeling painful contractions, but just intermittently.  No LOF or VB  Good FM     Objective   Min/max vitals past 24 hours:  Temp  Min: 98 °F (36.7 °C)  Max: 98.7 °F (37.1 °C)   BP  Min: 104/70  Max: 154/105   Pulse  Min: 79  Max: 104   Resp  Min: 16  Max: 16        FHT's: reactive, reassuring   Cervix: was checked (by me): fingertip/ thick/ -3   Contractions: irregular          Assessment   IUP at 37w5d  IOL for preeclampsia  GBS negative       Plan   Continue with induction  ABx for GBS prophylaxis when in active labor  Will start magnesium when in active labor    Emi Carty MD  2023  08:59 CDT

## 2023-07-23 NOTE — PLAN OF CARE
Problem: Adult Inpatient Plan of Care  Goal: Plan of Care Review  Outcome: Ongoing, Progressing  Flowsheets (Taken 7/23/2023 1811)  Progress: no change  Plan of Care Reviewed With: patient  Outcome Evaluation: Pt states pain is 5/10. Pitocin stopped and starting Cytotec at 2100 for 4 doses. SVE unchanged. Pt has regular diet  Goal: Patient-Specific Goal (Individualized)  Outcome: Ongoing, Progressing  Goal: Absence of Hospital-Acquired Illness or Injury  Outcome: Ongoing, Progressing  Intervention: Identify and Manage Fall Risk  Recent Flowsheet Documentation  Taken 7/23/2023 1030 by Nessa Patel RN  Safety Promotion/Fall Prevention: safety round/check completed  Taken 7/23/2023 0825 by Nessa Patel RN  Safety Promotion/Fall Prevention: safety round/check completed  Taken 7/23/2023 0735 by Nessa Patel RN  Safety Promotion/Fall Prevention: safety round/check completed  Intervention: Prevent Skin Injury  Recent Flowsheet Documentation  Taken 7/23/2023 1030 by Nessa Patel RN  Body Position: (sitting on birthing ball) sitting up in bed  Taken 7/23/2023 0735 by Nessa Patel RN  Body Position: sitting up in bed  Goal: Optimal Comfort and Wellbeing  Outcome: Ongoing, Progressing  Goal: Readiness for Transition of Care  Outcome: Ongoing, Progressing     Problem: Bleeding (Labor)  Goal: Hemostasis  Outcome: Ongoing, Progressing     Problem: Change in Fetal Wellbeing (Labor)  Goal: Stable Fetal Wellbeing  Outcome: Ongoing, Progressing  Intervention: Promote and Monitor Fetal Wellbeing  Recent Flowsheet Documentation  Taken 7/23/2023 1030 by Nessa Patel RN  Body Position: (sitting on birthing ball) sitting up in bed  Taken 7/23/2023 0735 by Nessa Patel RN  Body Position: sitting up in bed     Problem: Delayed Labor Progression (Labor)  Goal: Effective Progression to Delivery  Outcome: Ongoing, Progressing     Problem: Infection (Labor)  Goal: Absence of Infection Signs and  Symptoms  Outcome: Ongoing, Progressing     Problem: Labor Pain (Labor)  Goal: Acceptable Pain Control  Outcome: Ongoing, Progressing     Problem: Uterine Tachysystole (Labor)  Goal: Normal Uterine Contraction Pattern  Outcome: Ongoing, Progressing   Goal Outcome Evaluation:  Plan of Care Reviewed With: patient        Progress: no change  Outcome Evaluation: Pt states pain is 5/10. Pitocin stopped and starting Cytotec at 2100 for 4 doses. SVE unchanged. Pt has regular diet

## 2023-07-24 ENCOUNTER — TELEPHONE (OUTPATIENT)
Dept: PHYSICAL THERAPY | Facility: OTHER | Age: 33
End: 2023-07-24
Payer: MEDICAID

## 2023-07-24 RX ORDER — MISOPROSTOL 100 MCG
25 TABLET ORAL EVERY 4 HOURS
Status: COMPLETED | OUTPATIENT
Start: 2023-07-24 | End: 2023-07-24

## 2023-07-24 RX ADMIN — Medication 25 MCG: at 18:57

## 2023-07-24 RX ADMIN — Medication 25 MCG: at 15:03

## 2023-07-24 RX ADMIN — Medication 25 MCG: at 05:37

## 2023-07-24 RX ADMIN — Medication 25 MCG: at 23:04

## 2023-07-24 RX ADMIN — Medication 25 MCG: at 09:31

## 2023-07-24 RX ADMIN — Medication 25 MCG: at 01:39

## 2023-07-24 NOTE — TELEPHONE ENCOUNTER
Caller: Dania Avina    Relationship: Self    What was the call regarding: PATIENT CANCELLED APPT DUE TO HER BEING IN THE HOSPITAL

## 2023-07-24 NOTE — PLAN OF CARE
Goal Outcome Evaluation:  Plan of Care Reviewed With: patient, spouse        Progress: no change  Outcome Evaluation: pt reveived 3rd dose of cytotec 25mg at 0535. Irregularly zayra. Patient sleeping in between care. VSS.

## 2023-07-24 NOTE — PROGRESS NOTES
Jesus Perez MD  [facility]  [address]  [place] [address]  Office [phone]  Fax [phone]       Reagan Avina  : 1990  MRN: [id number]  CSN: [id number]    Labor progress note    Subjective   She reports  mild cramping this morning.  Status post Cytotec dose #3.  Denies headache or visual changes.     Objective   Min/max vitals past 24 hours:  Temp  Min: 97.7 °F (36.5 °C)  Max: 98.3 °F (36.8 °C)   BP  Min: 105/64  Max: 141/95   Pulse  Min: 72  Max: 102   Resp  Min: 16  Max: 18        FHT's: reactive and category 1.  external monitors used   Cervix: Exam difficult due to patient tolerance of exam.  Unable to reach internal os but cervix feels soft and about 50% effaced.   Contractions: irregular    Patellar DTRs 1+, 1 beat clonus, 1+ edema      Assessment   IUP at 37w6d  Gestational hypertension     Plan   1.    Continue with induction of labor using Cytotec, dose #4 due within the next couple of hours.  Patient able to ambulate, continue with regular diet at this time.  Blood pressure is stable.  Allow labor to continue pending maternal and fetal status  Plan discussed with family and questions answered.  Understanding verbalized.    Jesus Perez MD  2023  09:06 CDT

## 2023-07-25 ENCOUNTER — ANESTHESIA (OUTPATIENT)
Dept: LABOR AND DELIVERY | Facility: HOSPITAL | Age: 33
End: 2023-07-25
Payer: MEDICAID

## 2023-07-25 ENCOUNTER — ANESTHESIA EVENT (OUTPATIENT)
Dept: LABOR AND DELIVERY | Facility: HOSPITAL | Age: 33
End: 2023-07-25
Payer: MEDICAID

## 2023-07-25 PROBLEM — Z64.1 MULTIGRAVIDA: Status: RESOLVED | Noted: 2023-04-30 | Resolved: 2023-07-25

## 2023-07-25 PROBLEM — O13.3 GESTATIONAL HYPERTENSION, THIRD TRIMESTER: Status: ACTIVE | Noted: 2023-07-25

## 2023-07-25 PROBLEM — O47.9 THREATENED LABOR: Status: RESOLVED | Noted: 2023-07-22 | Resolved: 2023-07-25

## 2023-07-25 PROCEDURE — 25010000002 ONDANSETRON PER 1 MG: Performed by: NURSE ANESTHETIST, CERTIFIED REGISTERED

## 2023-07-25 PROCEDURE — 25010000002 DEXAMETHASONE PER 1 MG: Performed by: NURSE ANESTHETIST, CERTIFIED REGISTERED

## 2023-07-25 PROCEDURE — 25010000002 KETOROLAC TROMETHAMINE PER 15 MG: Performed by: NURSE ANESTHETIST, CERTIFIED REGISTERED

## 2023-07-25 PROCEDURE — 25010000002 CEFAZOLIN PER 500 MG: Performed by: OBSTETRICS & GYNECOLOGY

## 2023-07-25 PROCEDURE — 25010000002 OXYTOCIN PER 10 UNITS: Performed by: NURSE ANESTHETIST, CERTIFIED REGISTERED

## 2023-07-25 PROCEDURE — 25010000002 FENTANYL CITRATE (PF) 100 MCG/2ML SOLUTION: Performed by: NURSE ANESTHETIST, CERTIFIED REGISTERED

## 2023-07-25 PROCEDURE — 25010000002 DROPERIDOL PER 5 MG: Performed by: NURSE ANESTHETIST, CERTIFIED REGISTERED

## 2023-07-25 PROCEDURE — 59514 CESAREAN DELIVERY ONLY: CPT | Performed by: OBSTETRICS & GYNECOLOGY

## 2023-07-25 PROCEDURE — 25010000002 HYDROMORPHONE 1 MG/ML SOLUTION: Performed by: NURSE ANESTHETIST, CERTIFIED REGISTERED

## 2023-07-25 PROCEDURE — 25010000002 KETOROLAC TROMETHAMINE PER 15 MG: Performed by: OBSTETRICS & GYNECOLOGY

## 2023-07-25 RX ORDER — FAMOTIDINE 20 MG/1
20 TABLET, FILM COATED ORAL ONCE AS NEEDED
Status: DISCONTINUED | OUTPATIENT
Start: 2023-07-25 | End: 2023-07-25 | Stop reason: HOSPADM

## 2023-07-25 RX ORDER — PROMETHAZINE HYDROCHLORIDE 25 MG/1
12.5 TABLET ORAL EVERY 6 HOURS PRN
Status: DISCONTINUED | OUTPATIENT
Start: 2023-07-25 | End: 2023-07-25 | Stop reason: HOSPADM

## 2023-07-25 RX ORDER — ONDANSETRON 2 MG/ML
INJECTION INTRAMUSCULAR; INTRAVENOUS AS NEEDED
Status: DISCONTINUED | OUTPATIENT
Start: 2023-07-25 | End: 2023-07-25 | Stop reason: SURG

## 2023-07-25 RX ORDER — NALOXONE HCL 0.4 MG/ML
0.4 VIAL (ML) INJECTION
Status: ACTIVE | OUTPATIENT
Start: 2023-07-25 | End: 2023-07-26

## 2023-07-25 RX ORDER — KETOROLAC TROMETHAMINE 30 MG/ML
30 INJECTION, SOLUTION INTRAMUSCULAR; INTRAVENOUS ONCE
Status: DISCONTINUED | OUTPATIENT
Start: 2023-07-25 | End: 2023-07-25 | Stop reason: HOSPADM

## 2023-07-25 RX ORDER — TRISODIUM CITRATE DIHYDRATE AND CITRIC ACID MONOHYDRATE 500; 334 MG/5ML; MG/5ML
30 SOLUTION ORAL ONCE
Status: COMPLETED | OUTPATIENT
Start: 2023-07-25 | End: 2023-07-25

## 2023-07-25 RX ORDER — ONDANSETRON 4 MG/1
4 TABLET, FILM COATED ORAL EVERY 6 HOURS PRN
Status: DISCONTINUED | OUTPATIENT
Start: 2023-07-25 | End: 2023-07-25 | Stop reason: HOSPADM

## 2023-07-25 RX ORDER — NALOXONE HCL 0.4 MG/ML
0.4 VIAL (ML) INJECTION
Status: DISCONTINUED | OUTPATIENT
Start: 2023-07-25 | End: 2023-07-27 | Stop reason: HOSPADM

## 2023-07-25 RX ORDER — ONDANSETRON 2 MG/ML
4 INJECTION INTRAMUSCULAR; INTRAVENOUS EVERY 6 HOURS PRN
Status: DISCONTINUED | OUTPATIENT
Start: 2023-07-25 | End: 2023-07-25 | Stop reason: HOSPADM

## 2023-07-25 RX ORDER — PROMETHAZINE HYDROCHLORIDE 12.5 MG/1
12.5 SUPPOSITORY RECTAL EVERY 6 HOURS PRN
Status: DISCONTINUED | OUTPATIENT
Start: 2023-07-25 | End: 2023-07-25 | Stop reason: HOSPADM

## 2023-07-25 RX ORDER — METHYLERGONOVINE MALEATE 0.2 MG/ML
200 INJECTION INTRAVENOUS ONCE AS NEEDED
Status: DISCONTINUED | OUTPATIENT
Start: 2023-07-25 | End: 2023-07-25 | Stop reason: HOSPADM

## 2023-07-25 RX ORDER — IBUPROFEN 600 MG/1
600 TABLET ORAL EVERY 6 HOURS
Status: DISCONTINUED | OUTPATIENT
Start: 2023-07-26 | End: 2023-07-27 | Stop reason: HOSPADM

## 2023-07-25 RX ORDER — OXYTOCIN/0.9 % SODIUM CHLORIDE 30/500 ML
999 PLASTIC BAG, INJECTION (ML) INTRAVENOUS ONCE
Status: DISCONTINUED | OUTPATIENT
Start: 2023-07-25 | End: 2023-07-27 | Stop reason: HOSPADM

## 2023-07-25 RX ORDER — MISOPROSTOL 200 UG/1
800 TABLET ORAL ONCE AS NEEDED
Status: DISCONTINUED | OUTPATIENT
Start: 2023-07-25 | End: 2023-07-25 | Stop reason: HOSPADM

## 2023-07-25 RX ORDER — CARBOPROST TROMETHAMINE 250 UG/ML
250 INJECTION, SOLUTION INTRAMUSCULAR AS NEEDED
Status: DISCONTINUED | OUTPATIENT
Start: 2023-07-25 | End: 2023-07-25 | Stop reason: HOSPADM

## 2023-07-25 RX ORDER — MISOPROSTOL 200 UG/1
800 TABLET ORAL ONCE AS NEEDED
Status: DISCONTINUED | OUTPATIENT
Start: 2023-07-25 | End: 2023-07-27 | Stop reason: HOSPADM

## 2023-07-25 RX ORDER — FAMOTIDINE 10 MG/ML
20 INJECTION, SOLUTION INTRAVENOUS ONCE AS NEEDED
Status: COMPLETED | OUTPATIENT
Start: 2023-07-25 | End: 2023-07-25

## 2023-07-25 RX ORDER — OXYTOCIN/0.9 % SODIUM CHLORIDE 30/500 ML
250 PLASTIC BAG, INJECTION (ML) INTRAVENOUS CONTINUOUS
Status: ACTIVE | OUTPATIENT
Start: 2023-07-25 | End: 2023-07-25

## 2023-07-25 RX ORDER — METHYLERGONOVINE MALEATE 0.2 MG/ML
200 INJECTION INTRAVENOUS AS NEEDED
Status: DISCONTINUED | OUTPATIENT
Start: 2023-07-25 | End: 2023-07-27 | Stop reason: HOSPADM

## 2023-07-25 RX ORDER — NALOXONE HCL 0.4 MG/ML
0.1 VIAL (ML) INJECTION
Status: DISCONTINUED | OUTPATIENT
Start: 2023-07-25 | End: 2023-07-25 | Stop reason: SDUPTHER

## 2023-07-25 RX ORDER — ACETAMINOPHEN 500 MG
1000 TABLET ORAL ONCE
Status: COMPLETED | OUTPATIENT
Start: 2023-07-25 | End: 2023-07-25

## 2023-07-25 RX ORDER — KETOROLAC TROMETHAMINE 15 MG/ML
15 INJECTION, SOLUTION INTRAMUSCULAR; INTRAVENOUS EVERY 6 HOURS
Status: DISPENSED | OUTPATIENT
Start: 2023-07-25 | End: 2023-07-26

## 2023-07-25 RX ORDER — ONDANSETRON 2 MG/ML
4 INJECTION INTRAMUSCULAR; INTRAVENOUS EVERY 6 HOURS PRN
Status: DISCONTINUED | OUTPATIENT
Start: 2023-07-25 | End: 2023-07-27 | Stop reason: HOSPADM

## 2023-07-25 RX ORDER — FAMOTIDINE 10 MG/ML
20 INJECTION, SOLUTION INTRAVENOUS ONCE AS NEEDED
Status: DISCONTINUED | OUTPATIENT
Start: 2023-07-25 | End: 2023-07-25 | Stop reason: HOSPADM

## 2023-07-25 RX ORDER — SIMETHICONE 80 MG
80 TABLET,CHEWABLE ORAL 4 TIMES DAILY PRN
Status: DISCONTINUED | OUTPATIENT
Start: 2023-07-25 | End: 2023-07-27 | Stop reason: HOSPADM

## 2023-07-25 RX ORDER — NALBUPHINE HYDROCHLORIDE 10 MG/ML
2.5 INJECTION, SOLUTION INTRAMUSCULAR; INTRAVENOUS; SUBCUTANEOUS EVERY 4 HOURS PRN
Status: ACTIVE | OUTPATIENT
Start: 2023-07-25 | End: 2023-07-26

## 2023-07-25 RX ORDER — OXYCODONE HYDROCHLORIDE 5 MG/1
5 TABLET ORAL EVERY 4 HOURS PRN
Status: DISCONTINUED | OUTPATIENT
Start: 2023-07-25 | End: 2023-07-27 | Stop reason: HOSPADM

## 2023-07-25 RX ORDER — ACETAMINOPHEN 500 MG
1000 TABLET ORAL EVERY 6 HOURS
Status: COMPLETED | OUTPATIENT
Start: 2023-07-25 | End: 2023-07-26

## 2023-07-25 RX ORDER — KETOROLAC TROMETHAMINE 30 MG/ML
INJECTION, SOLUTION INTRAMUSCULAR; INTRAVENOUS AS NEEDED
Status: DISCONTINUED | OUTPATIENT
Start: 2023-07-25 | End: 2023-07-25 | Stop reason: SURG

## 2023-07-25 RX ORDER — DEXAMETHASONE SODIUM PHOSPHATE 4 MG/ML
INJECTION, SOLUTION INTRA-ARTICULAR; INTRALESIONAL; INTRAMUSCULAR; INTRAVENOUS; SOFT TISSUE AS NEEDED
Status: DISCONTINUED | OUTPATIENT
Start: 2023-07-25 | End: 2023-07-25 | Stop reason: SURG

## 2023-07-25 RX ORDER — OXYCODONE HYDROCHLORIDE 5 MG/1
10 TABLET ORAL EVERY 4 HOURS PRN
Status: DISCONTINUED | OUTPATIENT
Start: 2023-07-25 | End: 2023-07-27 | Stop reason: HOSPADM

## 2023-07-25 RX ORDER — HYDROMORPHONE HYDROCHLORIDE 1 MG/ML
0.5 INJECTION, SOLUTION INTRAMUSCULAR; INTRAVENOUS; SUBCUTANEOUS
Status: DISCONTINUED | OUTPATIENT
Start: 2023-07-25 | End: 2023-07-25 | Stop reason: SDUPTHER

## 2023-07-25 RX ORDER — CARBOPROST TROMETHAMINE 250 UG/ML
250 INJECTION, SOLUTION INTRAMUSCULAR ONCE AS NEEDED
Status: DISCONTINUED | OUTPATIENT
Start: 2023-07-25 | End: 2023-07-27 | Stop reason: HOSPADM

## 2023-07-25 RX ORDER — FENTANYL CITRATE 50 UG/ML
INJECTION, SOLUTION INTRAMUSCULAR; INTRAVENOUS AS NEEDED
Status: DISCONTINUED | OUTPATIENT
Start: 2023-07-25 | End: 2023-07-25 | Stop reason: SURG

## 2023-07-25 RX ORDER — CALCIUM CARBONATE 500 MG/1
2 TABLET, CHEWABLE ORAL 3 TIMES DAILY PRN
Status: DISCONTINUED | OUTPATIENT
Start: 2023-07-25 | End: 2023-07-25 | Stop reason: HOSPADM

## 2023-07-25 RX ORDER — ONDANSETRON 2 MG/ML
4 INJECTION INTRAMUSCULAR; INTRAVENOUS EVERY 6 HOURS PRN
Status: DISCONTINUED | OUTPATIENT
Start: 2023-07-25 | End: 2023-07-25 | Stop reason: SDUPTHER

## 2023-07-25 RX ORDER — BUPIVACAINE HCL/0.9 % NACL/PF 0.125 %
PLASTIC BAG, INJECTION (ML) EPIDURAL AS NEEDED
Status: DISCONTINUED | OUTPATIENT
Start: 2023-07-25 | End: 2023-07-25 | Stop reason: SURG

## 2023-07-25 RX ORDER — PRENATAL VIT/IRON FUM/FOLIC AC 27MG-0.8MG
1 TABLET ORAL DAILY
Status: DISCONTINUED | OUTPATIENT
Start: 2023-07-25 | End: 2023-07-27 | Stop reason: HOSPADM

## 2023-07-25 RX ORDER — DROPERIDOL 2.5 MG/ML
0.62 INJECTION, SOLUTION INTRAMUSCULAR; INTRAVENOUS ONCE AS NEEDED
Status: COMPLETED | OUTPATIENT
Start: 2023-07-25 | End: 2023-07-25

## 2023-07-25 RX ORDER — OXYTOCIN/0.9 % SODIUM CHLORIDE 30/500 ML
999 PLASTIC BAG, INJECTION (ML) INTRAVENOUS ONCE
Status: DISCONTINUED | OUTPATIENT
Start: 2023-07-25 | End: 2023-07-25 | Stop reason: HOSPADM

## 2023-07-25 RX ORDER — DOCUSATE SODIUM 100 MG/1
100 CAPSULE, LIQUID FILLED ORAL 2 TIMES DAILY PRN
Status: DISCONTINUED | OUTPATIENT
Start: 2023-07-25 | End: 2023-07-27 | Stop reason: HOSPADM

## 2023-07-25 RX ORDER — HYDROMORPHONE HYDROCHLORIDE 1 MG/ML
0.5 INJECTION, SOLUTION INTRAMUSCULAR; INTRAVENOUS; SUBCUTANEOUS
Status: DISCONTINUED | OUTPATIENT
Start: 2023-07-25 | End: 2023-07-25 | Stop reason: HOSPADM

## 2023-07-25 RX ORDER — ONDANSETRON 4 MG/1
4 TABLET, FILM COATED ORAL EVERY 8 HOURS PRN
Status: DISCONTINUED | OUTPATIENT
Start: 2023-07-25 | End: 2023-07-27 | Stop reason: HOSPADM

## 2023-07-25 RX ORDER — CARBOPROST TROMETHAMINE 250 UG/ML
250 INJECTION, SOLUTION INTRAMUSCULAR
Status: DISCONTINUED | OUTPATIENT
Start: 2023-07-25 | End: 2023-07-25 | Stop reason: HOSPADM

## 2023-07-25 RX ORDER — MORPHINE SULFATE 2 MG/ML
2 INJECTION, SOLUTION INTRAMUSCULAR; INTRAVENOUS
Status: DISCONTINUED | OUTPATIENT
Start: 2023-07-25 | End: 2023-07-27 | Stop reason: HOSPADM

## 2023-07-25 RX ORDER — ACETAMINOPHEN 325 MG/1
650 TABLET ORAL EVERY 6 HOURS
Status: DISCONTINUED | OUTPATIENT
Start: 2023-07-26 | End: 2023-07-27 | Stop reason: HOSPADM

## 2023-07-25 RX ORDER — OXYTOCIN 10 [USP'U]/ML
INJECTION, SOLUTION INTRAMUSCULAR; INTRAVENOUS AS NEEDED
Status: DISCONTINUED | OUTPATIENT
Start: 2023-07-25 | End: 2023-07-25 | Stop reason: SURG

## 2023-07-25 RX ORDER — ACETAMINOPHEN 325 MG/1
650 TABLET ORAL EVERY 4 HOURS PRN
Status: DISCONTINUED | OUTPATIENT
Start: 2023-07-25 | End: 2023-07-25 | Stop reason: HOSPADM

## 2023-07-25 RX ADMIN — OXYTOCIN 20 UNITS: 10 INJECTION INTRAVENOUS at 12:49

## 2023-07-25 RX ADMIN — SODIUM CHLORIDE, POTASSIUM CHLORIDE, SODIUM LACTATE AND CALCIUM CHLORIDE: 600; 310; 30; 20 INJECTION, SOLUTION INTRAVENOUS at 12:49

## 2023-07-25 RX ADMIN — Medication 100 MCG: at 12:05

## 2023-07-25 RX ADMIN — ACETAMINOPHEN 1000 MG: 500 TABLET, FILM COATED ORAL at 10:41

## 2023-07-25 RX ADMIN — DROPERIDOL 0.62 MG: 2.5 INJECTION, SOLUTION INTRAMUSCULAR; INTRAVENOUS at 16:23

## 2023-07-25 RX ADMIN — SODIUM CITRATE AND CITRIC ACID MONOHYDRATE 30 ML: 500; 334 SOLUTION ORAL at 11:33

## 2023-07-25 RX ADMIN — SODIUM CHLORIDE, POTASSIUM CHLORIDE, SODIUM LACTATE AND CALCIUM CHLORIDE 125 ML/HR: 600; 310; 30; 20 INJECTION, SOLUTION INTRAVENOUS at 03:16

## 2023-07-25 RX ADMIN — ACETAMINOPHEN 1000 MG: 500 TABLET, FILM COATED ORAL at 16:55

## 2023-07-25 RX ADMIN — DEXAMETHASONE SODIUM PHOSPHATE 8 MG: 4 INJECTION INTRA-ARTICULAR; INTRALESIONAL; INTRAMUSCULAR; INTRAVENOUS; SOFT TISSUE at 12:13

## 2023-07-25 RX ADMIN — OXYTOCIN 30 UNITS: 10 INJECTION INTRAVENOUS at 12:11

## 2023-07-25 RX ADMIN — SODIUM CHLORIDE, POTASSIUM CHLORIDE, SODIUM LACTATE AND CALCIUM CHLORIDE 1000 ML: 600; 310; 30; 20 INJECTION, SOLUTION INTRAVENOUS at 10:44

## 2023-07-25 RX ADMIN — ONDANSETRON 4 MG: 2 INJECTION INTRAMUSCULAR; INTRAVENOUS at 12:09

## 2023-07-25 RX ADMIN — KETOROLAC TROMETHAMINE 30 MG: 30 INJECTION, SOLUTION INTRAMUSCULAR; INTRAVENOUS at 12:36

## 2023-07-25 RX ADMIN — KETOROLAC TROMETHAMINE 15 MG: 15 INJECTION, SOLUTION INTRAMUSCULAR; INTRAVENOUS at 20:23

## 2023-07-25 RX ADMIN — Medication 2 MILLI-UNITS/MIN: at 03:16

## 2023-07-25 RX ADMIN — HYDROMORPHONE HYDROCHLORIDE 0.1 MG: 1 INJECTION, SOLUTION INTRAMUSCULAR; INTRAVENOUS; SUBCUTANEOUS at 11:55

## 2023-07-25 RX ADMIN — CEFAZOLIN 2 G: 2 INJECTION, POWDER, FOR SOLUTION INTRAMUSCULAR; INTRAVENOUS at 11:58

## 2023-07-25 RX ADMIN — FAMOTIDINE 20 MG: 10 INJECTION INTRAVENOUS at 11:33

## 2023-07-25 RX ADMIN — FENTANYL CITRATE 20 MCG: 50 INJECTION, SOLUTION INTRAMUSCULAR; INTRAVENOUS at 11:55

## 2023-07-25 NOTE — OP NOTE
Reagan Avina  : 1990  MRN: [id number]  [facility]: [id number]  Date: 2023    Operative Note        Pre-op Diagnosis:  Pregnancy at 38 weeks  Gestational hypertension  Failed induction, patient request   Post-op Diagnosis:  Same   Procedure: Procedure(s):   SECTION PRIMARY   Surgeon: Surgeon(s):  Jesus Perez MD       Anesthesia: Spinal     Estimated Blood Loss: 675   mLs   Fluids: 1000   mLs   UOP: 25   mLs   Drains: Arteaga catheter   ABx: Kefzol     Specimens:  None   Findings: Normal uterus, tubes, and ovaries.    Complications: None       INDICATION: Dania Avina is a 33 y.o. female who was admitted for induction of labor due to gestational hypertension.  After 2 days of attempted induction with Cytotec and Pitocin, she has requested  delivery.  She has been relatively intolerant of vaginal examination precluding the use of Cervidil or Arteaga balloon.     PROCEDURE: After informed consent was obtained, the patient was taken to the operating room where spinal anesthesia was administered. Time out procedure was completed and perioperative antibiotics were administered. She was placed in the supine position with leftward tilt and her abdomen was prepped and draped in normal sterile fashion after a Arteaga catheter was placed by nursing staff.   After confirming adequate anesthesia, a Pfannenstiel incision was made with a scalpel 2 fingerbreadths above the pubic symphysis.  This was carried down sharply to the underlying fascia which was incised in the midline.  The fascial incision was extended laterally with Mccann scissors.  The fascial edges were then elevated and the underlying rectus muscles dissected off with sharp technique.  The rectus muscles were sharply  in the midline and the underlying peritoneum identified and entered sharply.  The peritoneal incision was then extended and an Tong-O retractor inserted, taking care to avoid entrapping the bowel.  A low  transverse uterine incision was made with a clean scalpel.  The uterine incision was bluntly extended and amniotomy was performed returning clear fluid.  The head of the infant was delivered through the incision without difficulty and the remainder of the infant delivered with fundal pressure.  The infant was vigorous on the field, the cord was clamped and cut, and the infant handed off to waiting nursery nurse.  The placenta was expressed.  The uterus was repaired in situ and cleared of clot and debris with a moist laparotomy sponge.  The uterine incision was closed with a running locked suture of 0 Monocryl.  A second imbricating layer of 0 Monocryl was placed for reinforcement.  The uterine incision was hemostatic.  The retractor was removed.  The parietal peritoneum was then closed with a running suture of 2-0 Vicryl Rapide.  The subfascial spaces and rectus muscles were inspected with hemostasis noted.  The fascia was then closed with a running suture of 0 Vicryl.  The subcutaneous tissues were irrigated and made hemostatic with Bovie cautery.  The subcutaneous tissues were reapproximated with interrupted sutures of 2-0 plain gut.  The skin was closed with a subcuticular stitch of 3-0 Vicryl Rapide and reinforced with Steri-Strips.  A sterile dressing was then applied.    After expressing the uterus the patient was transitioned to the stretcher and taken to the recovery room in stable condition.  She tolerated the procedure well without complications.  All sponge, needle, and instrument counts were correct ×3 per the OR staff.    Jesus Perez MD   7/25/2023  13:47 CDT

## 2023-07-25 NOTE — LACTATION NOTE
Mother's Name: Dania Phone #:[phone]  Infant Name: Aleja  :2023  Gestation:38w0d  Day of life:0  Birth weight:  6-12.3 (3070g) Discharge weight:  Weight Loss:   24 hour Summary of Feeds:  Voids:  Stools:  Assistive devices (shields, shells, etc):NA  Significant Maternal history:, anemia, GHTN, Anxiety, depression,  first child for 3 months (now 12 yr old)  Maternal Concerns:  denies  Maternal Goal: exclusive breastfeeding for 1 year  Mother's Medications: Vit D3, Unisom, Pepcid, Mg oxide, PNV, Vit B6, Zinc  Breastpump for home: Medela  Ped follow up appt:    Mother here for multiday induction, failed, opted for csection. Infant transitioning in nicu currently. Initial breastfeeding packet given to mother. Discussed importance of early stimulation. Mother gives permission for hand expression. Collected 4.5 ml of colostrum, taken to nicu, labeled and placed at infant's bedside, RN aware. Will follow up with progress of infant throughout shift for feeding plan.     Instructed mom our lactation team is here for continued support throughout their breastfeeding journey. Our team has encouraged mom to call with any questions or concerns that may arise after discharge.     Breastfeeding and Diaper Chart  Check List for Essentials of Positioning And Latch-on handout provided by Lactation Education Resources  Hand Expression handout provided by Lactation Education Resources  Five Keys to Successful Breastfeeding handout provided by Lactation Education Resources    The Many Benefits if Breastfeeding handout given  Breastfeeding saves time  *Breastfeeding allows you to calm or feed your baby immediately, which leads to a happier baby who cries less  *There is nothing to buy, prepare, or maintain.There is nothing to clean or sterilize.  Breastfeeding builds a mothers confidence  *She knows all her baby needs to thrive is her!  Breastfeeding saves Money  *There is no formula to buy and healthier breast  fed babies have less medical costs  Healthy Mom/Healthy baby  * babies get sick less often, and when they do they are usually sick less severely and for a shorter time  * babies have fewer ear infections  * babies have fewer allergies  *Mothers who breastfeed have a lower risk for cancer, osteoporosis, anemia, high blood pressure, obesity, and Type ll diabetes  *Mothers miss less work days with sick babies  Breast fed babies have a better dental health  * babies have better jaw development which requires lest orthodontic work  *Breast milk does not promote cavities  * babies can nurse at night without worry of tooth decay  Breastfeeding allows a baby to reach his full IQ potential  *The longer a baby is breast fed, the better their brain development  Breast fed babies and moms are more relaxed  *The hormones released during breastfeeding have a calming effect on mothers  *Breastfeeding requires mom to take a break; this may help mom get more rest after delivery  *Breastfeeding is quicker than preparing formula which allows mom and baby to get back to sleep faster  *Breastfeeding promotes bonding and allows mom to learn babies cues and care needs more quickly  Breastfeeding cleanup is easier  *The bowel movements and spit up of breast fed babies doesn't smell as bad  *Spit-up of breast fed babies doesn't stain clothing  Getting out of the hourse is easier  *No formula bottles to prepare and carry safely   *No time restraints due to worry about what baby will eat  *No worries about warming a bottle or finding safe water to prepare bottles  Breastfeeding mother get their bodies back sooner  *The uterus shrinks more quickly and completely, which allows a flatter tummy  *Breastfeeding burns 400-500 calories a day; making milk torches stored fat!  Breastfeeding is better for the environment  *There is no trash to dispose of after breastfeeding  *There is no production facility  to produce breast milk; moms body does it all without the pollution of a factory      Your Guide to Breastfeeding Booklet by [employer], www.[employer]      Safe Storage of Breastmilk magnet: [employer]    Educational Breastfeeding Videos on   YouTube  (length of video in minutes)    Expressing the First Milk - Small Baby Series (7:19)  Hand Expression [facility] (7:34)  Attaching Your Baby at the Breast - Breastfeeding Series (10:26)  The Power of Pumping - [facility]   Maximizing Production [facility] (9:35)  Instructions for use Medela Symphony breastpump (English) (1:58)  Medela 2-Phase Expression (4:05)  Medela double pumping video (2:19)  Choosing your Personal[employer] breast shield size (3:04)  We also recommend visiting www.[employer] for valuable education and videos on breastfeeding full term AND  infants. This is a great resource to begin learning about breastfeeding during pregnancy as well.                [facility]             [phone]

## 2023-07-25 NOTE — PAYOR COMM NOTE
"[facility]  [place],  [phone]  or  fax    [phone]    Froylan Avina (33 y.o. Female)       Date of Birth   1990    Social Security Number       Address   [address] [address]    Home Phone   [phone]    MRN   [id number]       Zoroastrianism   Tenriism    Marital Status                               Admission Date   7/22/23    Admission Type   Elective    Admitting Provider   Jesus Perez MD    Attending Provider   Jesus Perez MD    Department, Room/Bed   [facility] LABOR DELIVERY, L05/1       Discharge Date       Discharge Disposition       Discharge Destination                                 Attending Provider: Jesus Perez MD    Allergies: Phenergan [Promethazine Hcl], Wasp Venom    Isolation: None   Infection: None   Code Status: Not on file    Ht: 160 cm (63\")   Wt: 93.9 kg (207 lb)    Admission Cmt: None   Principal Problem: Threatened labor [O47.9]                   Active Insurance as of 7/22/2023       Primary Coverage       Payor Plan Insurance Group Employer/Plan Group    ANTHEM MEDICAID ANTHEM MEDICAID [id number]       Payor Plan Address Payor Plan Phone Number Payor Plan Fax Number Effective Dates    [address] [address] [phone]  12/1/2022 - None Entered    [facility] [id number]         Subscriber Name Subscriber Birth Date Member ID       FROYLAN AVINA 1990 [id number]                     Emergency Contacts        (Rel.) Home Phone Work Phone Mobile Phone    JEANETTE AVINA (Spouse) -- -- [phone]          Nessa ortega, RN   Registered Nurse  Obstetrics     Plan of Care     Signed     Date of Service: 07/23/23 1817  Creation Time: 07/23/23 1817     Signed            Problem: Adult Inpatient Plan of Care  Goal: Plan of Care Review  Outcome: Ongoing, Progressing  Flowsheets (Taken 7/23/2023 1811)  Progress: no change  Plan of Care Reviewed With: patient  Outcome Evaluation: Pt states " pain is 5/10. Pitocin stopped and starting Cytotec at 2100 for 4 doses. SVE unchanged. Pt has regular diet              Kate Liz RN   Registered Nurse  Obstetrics     Plan of Care     Signed     Date of Service: 23  Creation Time: 23     Signed         Goal Outcome Evaluation:  Plan of Care Reviewed With: patient, spouse  Progress: no change  Outcome Evaluation: pt reveived 3rd dose of cytotec 25mg at 0535. Irregularly zayra. Patient sleeping in between care. VSS.                  Daisy Ornelas RN   Registered Nurse  Obstetrics     Plan of Care     Signed     Date of Service: 23  Creation Time: 23     Signed         Goal Outcome Evaluation:   Pitocin IOL started, no change in cervical exam. Wants to avoid an epidural prefers to be able to move during labor. Plans to do skin to skin care and breastfeed.                          History & Physical        Emi Carty MD at 23 0324           Reagan Avina  : 1990  MRN: [id number]  CSN: [id number]    History and Physical    Subjective  Dania Avina is a 33 y.o. year old  with an Estimated Date of Delivery: 23 currently at 37w5d presenting with headache and increased swelling and 2 beats of clonus .  Patient had preeclampsia with first pregnancy.  She is a nurse and knew the signs, so took her BP and it was elevated.  Labs on admission were normal, but she qualifies for diagnosis of preeclampsia based on BP and symptoms.  Highest BP since admission has been 135/103 and 146/96.    Prenatal care has been with Dr. Zeeshan Perez.  It has been benign.    OB History    Para Term  AB Living   2 1 1 0 0 1   SAB IAB Ectopic Molar Multiple Live Births   0 0 0 0 0 1      # Outcome Date GA Lbr Nino/2nd Weight Sex Delivery Anes PTL Lv   2 Current            1 Term 2011 39w0d  3289 g (7 lb 4 oz) M Vag-Spont EPI  RADHA      Complications: Pre-eclampsia     Past Medical  History:   Diagnosis Date    Anemia     Anxiety     Depression     Gallstone     Gestational hypertension 2011    During labor    Migraine     PMS (premenstrual syndrome)     Preeclampsia      labor     Tremor     Varicella      Past Surgical History:   Procedure Laterality Date    CHOLECYSTECTOMY WITH INTRAOPERATIVE CHOLANGIOGRAM N/A 10/01/2020    Procedure: LAPAROSCOPIC CHOLECYSTECTOMY;  Surgeon: Heather Glass MD;  [facility];  Service: General;  Laterality: N/A;    WISDOM TOOTH EXTRACTION         Current Facility-Administered Medications:     acetaminophen (TYLENOL) tablet 650 mg, 650 mg, Oral, Q4H PRN, Emi Carty MD    lactated ringers bolus 1,000 mL, 1,000 mL, Intravenous, Once, Emi Carty MD    lactated ringers infusion, 125 mL/hr, Intravenous, Continuous, Emi Carty MD, Last Rate: 125 mL/hr at 23 2337, 125 mL/hr at 23    Morphine injection 10 mg, 10 mg, Subcutaneous, Q2H PRN, Emi Carty MD    ondansetron (ZOFRAN) tablet 4 mg, 4 mg, Oral, Q6H PRN **OR** ondansetron (ZOFRAN) injection 4 mg, 4 mg, Intravenous, Q6H PRN, Emi Carty MD    oxytocin (PITOCIN) 30 units in 0.9% sodium chloride 500 mL (premix), 2-30 justin-units/min, Intravenous, Titrated, Emi Carty MD, Last Rate: 6 mL/hr at 23 0100, 6 justin-units/min at 23 0100    promethazine (PHENERGAN) suppository 12.5 mg, 12.5 mg, Rectal, Q6H PRN **OR** promethazine (PHENERGAN) tablet 12.5 mg, 12.5 mg, Oral, Q6H PRN, Emi Carty MD    sodium chloride 0.9 % flush 10 mL, 10 mL, Intravenous, Q12H, Emory Chan MD    sodium chloride 0.9 % flush 10 mL, 10 mL, Intravenous, PRN, Emory Chan MD    terbutaline (BRETHINE) injection 0.25 mg, 0.25 mg, Subcutaneous, PRN, Emi Carty MD    Allergies   Allergen Reactions    Phenergan [Promethazine Hcl] Nausea And Vomiting    Wasp Venom Swelling     swelling     Social History    Tobacco Use      Smoking status: Never       "Smokeless tobacco: Never    Review of Systems      Objective  /96   Pulse 92   Temp 98.2 °F (36.8 °C)   Resp 16   Ht 160 cm (63\")   Wt 93.9 kg (207 lb)   LMP 01/28/2022   SpO2 97%   Breastfeeding Yes   BMI 36.67 kg/m²   General: well developed; well nourished  no acute distress   Heart: Not performed.   Lungs: breathing is unlabored   Abdomen: soft, non-tender; no masses  fundus firm and non-tender   FHT's: reactive, reassuring  120 + accels, no decels, moderate variability   Cervix: was checked (by RN): finger tip/ 50 % / -3   Presentation: cephalic   Contractions:  EFW by Leopold's:  EFW by recent u/s: irregular    AFW 60% four weeks ago       Prenatal Labs  Lab Results   Component Value Date    HGB 10.8 (L) 07/22/2023    HEPBSAG Negative 01/06/2023    ABO B 07/22/2023    RH Positive 07/22/2023    ABSCRN Negative 07/22/2023    [id number] Non Reactive 01/06/2023    HEPCVIRUSABY <0.1 01/06/2023    URINECX Final report 01/06/2023       Current Labs Reviewed   CBC, CMP, and uric acid       Assessment  IUP at 37w5d with preeclampsia  Fetus reassuring and reactive  Group B strep status: negative     Plan  IOL with low-dose pitocin    Emi Carty MD  7/23/2023  03:24 CDT       Electronically signed by Emi Carty MD at 07/23/23 0330       Vital Signs (last 3 days)       Date/Time Temp Temp src Pulse Resp BP Patient Position SpO2    07/25/23 0600 -- -- 85 16 152/95 Lying --    07/25/23 0500 -- -- 88 18 132/84 Lying --    07/25/23 0400 98 (36.7) Oral 76 16 122/77 Sitting --    07/25/23 0322 -- -- 85 16 126/90 Lying --    07/25/23 0200 -- -- 89 16 120/81 Lying --    07/25/23 0100 -- -- 83 16 113/60 Lying --    07/25/23 0031 98.1 (36.7) Oral 98 16 119/82 Lying --    07/24/23 2300 -- -- 76 16 115/81 Lying --    07/24/23 2200 -- -- 73 -- 118/67 Lying --    07/24/23 2145 98.1 (36.7) Oral 91 18 147/94 Sitting --    07/24/23 1803 -- -- 97 18 141/95 Lying --    07/24/23 1547 -- -- 93 -- 127/93 Lying --    " 07/24/23 1300 -- -- 97 16 135/99 Lying --    07/24/23 1200 -- -- 72 -- 110/72 Lying --    07/24/23 1125 98.6 (37) Oral -- -- -- -- --    07/24/23 1100 -- -- 84 16 135/93 Lying --    07/24/23 1000 -- -- 86 18 127/88 Lying --    07/24/23 0900 98.3 (36.8) Oral 88 18 125/83 Lying --    07/24/23 0715 97.7 (36.5) Oral 75 18 128/80 Lying --    07/24/23 0701 -- -- 86 -- 138/96 Lying --    07/24/23 0200 98 (36.7) -- 88 18 123/82 Lying --    07/24/23 0140 -- -- 84 18 108/61 Lying --    07/23/23 2200 -- -- 91 -- 125/87 -- --    07/23/23 2104 -- -- 82 -- 141/95 -- --    07/23/23 2001 -- -- 99 -- 129/87 -- --    07/23/23 1700 98 (36.7) Temporal 73 18 106/66 Lying --    07/23/23 1600 -- -- 87 -- 136/91 Lying --    07/23/23 1554 -- -- 102 -- 141/97 Lying --    07/23/23 1300 -- -- 72 18 105/64 Lying --    07/23/23 1253 -- -- 72 -- 106/64 Lying --    07/23/23 1100 -- -- 93 -- 129/94 Lying --    07/23/23 1038 98.3 (36.8) Oral 89 16 138/92 Lying --    07/23/23 0900 -- -- 74 -- 137/98 Lying --    07/23/23 0800 -- -- 104 -- 146/102 Lying --    07/23/23 0740 98.7 (37.1) Oral 91 16 140/95 Lying --    07/23/23 0720 -- -- 91 -- 138/98 Lying --    07/23/23 0600 -- -- 82 -- 111/78 Lying --    07/23/23 0500 98 (36.7) Oral 88 16 104/70 Lying --    07/23/23 0400 -- -- 81 -- 115/81 -- --    07/23/23 0300 -- -- 94 -- 134/94 -- --    07/23/23 0200 -- -- 92 -- 129/95 -- --    07/23/23 0100 -- -- 98 -- 130/95 -- --    07/23/23 0000 -- -- 92 -- 138/87 -- --    07/22/23 2343 -- -- 92 16 146/96 -- --    07/22/23 2337 -- -- 89 -- 154/105 -- --    07/22/23 2325 -- -- 92 -- -- -- 97    07/22/23 2320 -- -- 86 -- -- -- 98    07/22/23 2315 -- -- 88 -- 141/89 -- 98    07/22/23 2310 -- -- 87 -- -- -- 97    07/22/23 2305 -- -- 91 -- -- -- 99    07/22/23 2300 -- -- 82 -- 134/86 -- 98    07/22/23 2245 -- -- 89 -- 135/103 -- 98    07/22/23 2240 -- -- 87 -- -- -- 99    07/22/23 2239 -- -- 87 -- -- -- 99    07/22/23 2235 -- -- 85 -- 139/95 -- 98    07/22/23 2230 --  -- 79 -- 131/84 -- 99    07/22/23 2225 -- -- 86 -- 136/79 -- 100    07/22/23 2220 -- -- 88 -- 143/92 -- 100    07/22/23 2215 98.2 (36.8) -- 88 -- 149/87 -- 99    07/22/23 2214 -- -- 86 -- 144/90 -- --    07/22/23 2212 -- -- 85 -- 142/96 -- --          Oxygen Therapy (last 3 days)       Date/Time SpO2 Device (Oxygen Therapy) Flow (L/min) Oxygen Concentration (%) ETCO2 (mmHg)    07/24/23 1930 -- room air -- -- --    07/24/23 1000 -- room air -- -- --    07/24/23 0900 -- room air -- -- --    07/24/23 0715 -- room air -- -- --    07/22/23 2325 97 -- -- -- --    07/22/23 2320 98 -- -- -- --    07/22/23 2315 98 -- -- -- --    07/22/23 2310 97 -- -- -- --    07/22/23 2305 99 -- -- -- --    07/22/23 2300 98 -- -- -- --    07/22/23 2245 98 -- -- -- --    07/22/23 2240 99 -- -- -- --    07/22/23 2239 99 -- -- -- --    07/22/23 2235 98 -- -- -- --    07/22/23 2230 99 -- -- -- --    07/22/23 2225 100 -- -- -- --    07/22/23 2220 100 -- -- -- --    07/22/23 2215 99 -- -- -- --          Intake & Output (last 3 days)         07/22 0701 07/23 0700 07/23 0701 07/24 0700 07/24 0701 07/25 0700 07/25 0701 07/26 0700    I.V. (mL/kg)  2000 (21.3)      Total Intake(mL/kg)  2000 (21.3)      Net  +2000                     Facility-Administered Medications as of 7/25/2023   Medication Dose Route Frequency Provider Last Rate Last Admin    acetaminophen (TYLENOL) tablet 650 mg  650 mg Oral Q4H PRN Emi Carty MD        lactated ringers bolus 1,000 mL  1,000 mL Intravenous Once Emi Carty MD        lactated ringers infusion  125 mL/hr Intravenous Continuous Emi Carty  mL/hr at 07/25/23 0316 125 mL/hr at 07/25/23 0316    [COMPLETED] miSOPROStol (CYTOTEC) split tablet 25 mcg  25 mcg Oral Q4H Emi Carty MD   25 mcg at 07/24/23 0931    [COMPLETED] miSOPROStol (CYTOTEC) split tablet 25 mcg  25 mcg Oral Q4H Jesus Perez MD   25 mcg at 07/24/23 2303    Morphine injection 10 mg  10 mg Subcutaneous Q2H PRN Carloz  MD Emi        ondansetron (ZOFRAN) tablet 4 mg  4 mg Oral Q6H PRN Emi Carty MD        Or    ondansetron (ZOFRAN) injection 4 mg  4 mg Intravenous Q6H PRN Emi Carty MD        oxytocin (PITOCIN) 30 units in 0.9% sodium chloride 500 mL (premix)  2-30 justin-units/min Intravenous Titrated Emi Carty MD 10 mL/hr at 07/25/23 0656 10 justin-units/min at 07/25/23 0656    promethazine (PHENERGAN) suppository 12.5 mg  12.5 mg Rectal Q6H PRN Emi Carty MD        Or    promethazine (PHENERGAN) tablet 12.5 mg  12.5 mg Oral Q6H PRN Emi Carty MD        sodium chloride 0.9 % flush 10 mL  10 mL Intravenous Q12H Emory Chan MD        sodium chloride 0.9 % flush 10 mL  10 mL Intravenous PRN Emory Chan MD        terbutaline (BRETHINE) injection 0.25 mg  0.25 mg Subcutaneous PRN Emi Carty MD         Orders (last 72 hrs)        Start     Ordered    07/24/23 2137  Start pitocin IV four hours after last dose of Cytotec  Misc Nursing Order (Specify)  Once        Comments: Start pitocin IV four hours after last dose of Cytotec    07/24/23 2137    07/24/23 1400  miSOPROStol (CYTOTEC) split tablet 25 mcg  Every 4 Hours         07/24/23 1335    07/24/23 1336  Pt may be off monitors to get up to shower  Misc Nursing Order (Specify)  Once        Comments: Pt may be off monitors to get up to shower    07/24/23 1338    07/24/23 0825  Continue with 4th dose of Cytotec at 0930, pt may continue regular diet  Misc Nursing Order (Specify)  Once        Comments: Continue with 4th dose of Cytotec at 0930, pt may continue regular diet    07/24/23 0826    07/23/23 2100  miSOPROStol (CYTOTEC) split tablet 25 mcg  Every 4 Hours         07/23/23 1752    07/23/23 1745  Pt can have regular diet, pt may off monitors for showers, RN does not need to perform SVE before each dose of cytotec unless there is a status change  Misc Nursing Order (Specify)  Once        Comments: Pt can have regular diet, pt may off monitors  for showers, RN does not need to perform SVE before each dose of cytotec unless there is a status change    07/23/23 1752    07/23/23 1730  Diet: Regular/House Diet; Texture: Regular Texture (IDDSI 7); Fluid Consistency: Thin (IDDSI 0)  Diet Effective Now         07/23/23 1729    07/23/23 0015  lactated ringers bolus 1,000 mL  Once         07/22/23 2327 07/23/23 0015  lactated ringers infusion  Continuous         07/22/23 2327 07/23/23 0015  oxytocin (PITOCIN) 30 units in 0.9% sodium chloride 500 mL (premix)  Titrated         07/22/23 2327 07/22/23 2330  sodium chloride 0.9 % flush 10 mL  Every 12 Hours Scheduled         07/22/23 2236 07/22/23 2328  LDP until 0530  [facility] Nursing Order (Specify)  Once        Comments: LDP until 0530    07/22/23 2327 07/22/23 2327  Admit To Obstetrics Inpatient  Once         07/22/23 2327 07/22/23 2327  Obtain informed consent  Once         07/22/23 2327 07/22/23 2327  Vital Signs Per hospital policy  Per Hospital Policy         07/22/23 2327 07/22/23 2327  Confirm presence of amniotic fluid  Once        Comments: If patient present with SROM    07/22/23 2327 07/22/23 2327  Keep exams to a minimum on patients with SROM  Continuous         07/22/23 2327 07/22/23 2327  Mini- prep prior to delivery  Once         07/22/23 2327 07/22/23 2327  Continuous Fetal Monitoring With NST on Admission and Prior to Initiation of Oxytocin.  Per Order Details        Comments: Continuous Fetal Monitoring With NST on Admission & Prior to Initiation of Oxytocin.    07/22/23 2327 07/22/23 2327  External Uterine Contraction Monitoring  Per Hospital Policy         07/22/23 2327 07/22/23 2327  Notify Physician (specified)  Until Discontinued         07/22/23 2327 07/22/23 2327  Notify physician for tachysystole (per hospital algorithm)  Until Discontinued         07/22/23 2327 07/22/23 2327  Notify physician if membranes ruptured, bleeding greater than 1 pad an  hour, fetal heart tone abnormality, and severe pain  Until Discontinued         23  Up Ad Vikki  Until Discontinued        Comments: Patient may stand/walk/use birthing ball or be in rocking chair as long as fetus still being monitored continuously    23  Cervical Exam  Once        Comments: Unless contraindicated, every 1-2 hours in active labor, or at nurse's discretion.    23  Initiate Group Beta Strep (GBS) Prophylaxis Protocol, If Criteria Met  Continuous        Comments: NO TREATMENT RECOMMENDED IF: 1)  Maternal GBS status known negative 2)  Scheduled  birth with intact membranes, not in labor.  3 ) Maternal GBS unknown, no risk factors.   TREAT WITH ANTIBIOTICS IF:  1)  Maternal GBS status is known postive.  2)  Maternal GBS status unknown with these risk factors:  a)  Previous infant affected by GBS infection.  b)  GBS urinary tract infection (UTI) or bacteruria during pregnancy  c)  Unexplained maternal fever in labor (greater than or equal to 100.4F or 38.0C)  d)  Prolonged rupture of the membranes greater than or equal to 18 hours.  e)  Gestational age less than 37 weeks.    23  NPO Diet NPO Type: Ice Chips  Diet Effective Now,   Status:  Canceled         23  Inpatient consult to Anesthesiology  Once        Comments: Patient may have epidural prn   Specialty:  Anesthesiology  Provider:  (Not yet assigned)    23  VTE Prophylaxis Not Indicated: No Risk Factors (0); </= 3 (Low Risk)  Once         23  promethazine (PHENERGAN) suppository 12.5 mg  Every 6 Hours PRN        See Hyperspace for full Linked Orders Report.    23  promethazine (PHENERGAN) tablet 12.5 mg  Every 6 Hours PRN        See Hyperspace for full Linked Orders Report.    23  terbutaline  (BRETHINE) injection 0.25 mg  As Needed         07/22/23 2327 07/22/23 2326  acetaminophen (TYLENOL) tablet 650 mg  Every 4 Hours PRN         07/22/23 2327 07/22/23 2326  Morphine injection 10 mg  Every 2 Hours PRN         07/22/23 2327 07/22/23 2326  ondansetron (ZOFRAN) tablet 4 mg  Every 6 Hours PRN        See Hyperspace for full Linked Orders Report.    07/22/23 2327 07/22/23 2326  ondansetron (ZOFRAN) injection 4 mg  Every 6 Hours PRN        See Hyperspace for full Linked Orders Report.    07/22/23 2327 07/22/23 2236  CBC & Differential  STAT         07/22/23 2236 07/22/23 2236  Comprehensive Metabolic Panel  STAT         07/22/23 2236 07/22/23 2236  Uric Acid  Once         07/22/23 2236 07/22/23 2236  Type & Screen  STAT         07/22/23 2236 07/22/23 2236  Insert Peripheral IV  Once         07/22/23 2236 07/22/23 2236  PERIPHERAL IV CARE - Connectors / Hubs Must Be Scrubbed 15 Seconds Using 70% Alcohol & Allowed to Dry Before Accessing Line  Continuous         07/22/23 2236 07/22/23 2236  CBC Auto Differential  PROCEDURE ONCE         07/22/23 2236 07/22/23 2235  sodium chloride 0.9 % flush 10 mL  As Needed         07/22/23 2236 07/22/23 2225  POC Protein, Urine, Qualitative, Dipstick  Once         07/22/23 2225    Unscheduled  Change Peripheral IV Site  As Needed      Comments: Frequency Per Facility Policy    07/22/23 2236    Unscheduled  PERIPHERAL IV CARE - Change Dressing As Needed When Damp, Loose or Soiled  As Needed       07/22/23 2236    Unscheduled  Position change  As Needed      Comments: For intra-uterine resuscitation for hypertonus, hypertstimulation, or non-reassuring fetal status    07/22/23 2327    Signed and Held  Notify Physician (specified)  Until Discontinued         Signed and Held    Signed and Held  Vital Signs Per hospital policy  Per Hospital Policy         Signed and Held    Signed and Held  Up Ad Vikki  Until Discontinued         Signed and Held     Signed and Held  Strict Intake and Output  Every Shift       Signed and Held    Signed and Held  Fundal & Lochia Check  Per Order Details      Comments: Every 15 Minutes x4, Then Every 30 Minutes x2, Then Every Shift    Signed and Held    Signed and Held  Fundal & Lochia Check  Every Shift       Signed and Held    Signed and Held  Apply Ice to Perineum  As Needed         Signed and Held    Signed and Held  Diet: Regular/House Diet; Texture: Regular Texture (IDDSI 7); Fluid Consistency: Thin (IDDSI 0)  Diet Effective Now         Signed and Held    Signed and Held  acetaminophen (TYLENOL) tablet 650 mg  Every 4 Hours PRN         Signed and Held    Signed and Held  HYDROmorphone (DILAUDID) injection 1 mg  Every 2 Hours PRN         Signed and Held    Signed and Held  methylergonovine (METHERGINE) injection 200 mcg  Once As Needed         Signed and Held    Signed and Held  carboprost (HEMABATE) injection 250 mcg  Every 15 Minutes PRN         Signed and Held    Signed and Held  miSOPROStol (CYTOTEC) tablet 800 mcg  Once As Needed         Signed and Held    Signed and Held  ondansetron (ZOFRAN) tablet 4 mg  Every 6 Hours PRN        See Hyperspace for full Linked Orders Report.    Signed and Held    Signed and Held  ondansetron (ZOFRAN) injection 4 mg  Every 6 Hours PRN        See Hyperspace for full Linked Orders Report.    Signed and Held    Signed and Held  promethazine (PHENERGAN) suppository 12.5 mg  Every 6 Hours PRN        See Hyperspace for full Linked Orders Report.    Signed and Held    Signed and Held  promethazine (PHENERGAN) tablet 12.5 mg  Every 6 Hours PRN        See Hyperspace for full Linked Orders Report.    Signed and Held    Signed and Held  famotidine (PEPCID) injection 20 mg  Once As Needed        See Hyperspace for full Linked Orders Report.    Signed and Held    Signed and Held  famotidine (PEPCID) tablet 20 mg  Once As Needed        See Hyperspace for full Linked Orders Report.    Signed and  Held    Signed and Held  calcium carbonate (TUMS) chewable tablet 500 mg (200 mg elemental)  3 Times Daily PRN         Signed and Held    Signed and Held  oxytocin (PITOCIN) 30 units in 0.9% sodium chloride 500 mL (premix)  Once        See Hyperspace for full Linked Orders Report.    Signed and Held    Signed and Held  oxytocin (PITOCIN) 30 units in 0.9% sodium chloride 500 mL (premix)  Continuous        See Hyperspace for full Linked Orders Report.    Signed and Held                     Physician Progress Notes (last 72 hours)        Jesus Perez MD at 23 0751              Jesus Perez MD  [facility]  [address] [place] [address]  Office [phone]  Fax [phone]      [facility]  Dania Avina  : 1990  MRN: [id number]  CSN: [id number]    Labor progress note    Subjective   She reports  contractions are feeling stronger than before. No headache or visual changes.  Ambulating.    Objective   Min/max vitals past 24 hours:  Temp  Min: 98 °F (36.7 °C)  Max: 98.6 °F (37 °C)   BP  Min: 110/72  Max: 152/95   Pulse  Min: 72  Max: 98   Resp  Min: 16  Max: 18        FHT's: reactive and category 1.  external monitors used   Cervix: was checked (by RN): 0 cm / 50 % / -3   Contractions: regular every 2 minutes  Pitocin at 10    DTR trace, no clonus, 1+ ankle edema     Assessment   IUP at 38w0d  Gestational HTN    Plan   1.   Continue with augmentation  Discussed patient's difficulty with vaginal exam / procedures. Discussed options of early epidural or elective . Patient to consider options while holding pitocin at 10. NPO for now.  Allow labor to continue pending maternal and fetal status  Plan discussed with family and questions answered.  Understanding verbalized.    Jesus Perez MD  2023  07:51 CDT             Electronically signed by Jesus Perez MD at 23 0754       Jesus Perez MD at 23 0906              Jesus Perez,  MD  [facility]  [address]  [place] [address]  Office [phone]  Fax [phone]       Reagan Avina  : 1990  MRN: [id number]  CSN: [id number]    Labor progress note    Subjective   She reports  mild cramping this morning.  Status post Cytotec dose #3.  Denies headache or visual changes.    Objective   Min/max vitals past 24 hours:  Temp  Min: 97.7 °F (36.5 °C)  Max: 98.3 °F (36.8 °C)   BP  Min: 105/64  Max: 141/95   Pulse  Min: 72  Max: 102   Resp  Min: 16  Max: 18        FHT's: reactive and category 1.  external monitors used   Cervix: Exam difficult due to patient tolerance of exam.  Unable to reach internal os but cervix feels soft and about 50% effaced.   Contractions: irregular    Patellar DTRs 1+, 1 beat clonus, 1+ edema     Assessment   IUP at 37w6d  Gestational hypertension    Plan   1.    Continue with induction of labor using Cytotec, dose #4 due within the next couple of hours.  Patient able to ambulate, continue with regular diet at this time.  Blood pressure is stable.  Allow labor to continue pending maternal and fetal status  Plan discussed with family and questions answered.  Understanding verbalized.    Jesus Perez MD  2023  09:06 CDT             Electronically signed by Jesus Perez MD at 23 0909       Emi Carty MD at 23 9600           Reagan Avina  : 1990  MRN: [id number]  CSN: [id number]    Labor progress note    Subjective   Patient currently reports is feeling painful contractions, but has not made any cervical change.  Dania has walked the guy or been on the birthing ball all day, but is still only fingertip.  No LOF or VB.    Objective   Min/max vitals past 24 hours:  Temp  Min: 98 °F (36.7 °C)  Max: 98.7 °F (37.1 °C)   BP  Min: 104/70  Max: 154/105   Pulse  Min: 72  Max: 104   Resp  Min: 16  Max: 18        FHT's: reactive, reassuring  130 + accels, no decels, moderate variability   Cervix:  was not checked. But she was recently checked by RN   Contractions: regular every 2 minutes         Assessment   IUP at 37w5d with preeclampsia  GBS negative  Fetus reassuring  IOL not progressing at all, despite contractions.  Patient has been on 20 miUnits of pitocin for most of the day    Plan   Turning off pitocin  Give patient a break for 3-4 hours and allow her to eat dinner  Will try cytotec tonight    Emi Carty MD  2023  17:37 CDT           Electronically signed by Emi Carty MD at 23 1740       Emi Carty MD at 23 0859           Reagan Avina  : 1990  MRN: [id number]  CSN: [id number]    Labor progress note    Subjective   Patient currently reports is feeling painful contractions, but just intermittently.  No LOF or VB  Good FM    Objective   Min/max vitals past 24 hours:  Temp  Min: 98 °F (36.7 °C)  Max: 98.7 °F (37.1 °C)   BP  Min: 104/70  Max: 154/105   Pulse  Min: 79  Max: 104   Resp  Min: 16  Max: 16        FHT's: reactive, reassuring   Cervix: was checked (by me): fingertip/ thick/ -3   Contractions: irregular         Assessment   IUP at 37w5d  IOL for preeclampsia  GBS negative      Plan   Continue with induction  ABx for GBS prophylaxis when in active labor  Will start magnesium when in active labor    Emi Carty MD  2023  08:59 CDT           Electronically signed by Emi Catry MD at 23 0900

## 2023-07-25 NOTE — PLAN OF CARE
Goal Outcome Evaluation:  Primary c/s. ALT incision warm and dry, covered with pressure dressing.  Ff/ml/uu-u1 scant to light lochia with some clots early in recovery period.  Pain rated 1/10 and not requesting any PRN medication at this time.  F/C draining to bedside bag collection. Infant girl is in NICU at this time                         Problem: Bleeding (Labor)  Goal: Hemostasis  Outcome: Unable to Meet, Plan Revised     Problem: Change in Fetal Wellbeing (Labor)  Goal: Stable Fetal Wellbeing  Outcome: Unable to Meet, Plan Revised     Problem: Delayed Labor Progression (Labor)  Goal: Effective Progression to Delivery  Outcome: Unable to Meet, Plan Revised     Problem: Infection (Labor)  Goal: Absence of Infection Signs and Symptoms  Outcome: Unable to Meet, Plan Revised     Problem: Labor Pain (Labor)  Goal: Acceptable Pain Control  Outcome: Unable to Meet, Plan Revised     Problem: Uterine Tachysystole (Labor)  Goal: Normal Uterine Contraction Pattern  Outcome: Unable to Meet, Plan Revised

## 2023-07-25 NOTE — PROGRESS NOTES
Jesus Perez MD  [facility]  [address]  [place] [address]  Office [phone]  Fax [phone]       Reagan Avina  : 1990  MRN: [id number]  CSN: [id number]    Labor progress note    Subjective   She reports  contractions are feeling stronger than before. No headache or visual changes.  Ambulating.     Objective   Min/max vitals past 24 hours:  Temp  Min: 98 °F (36.7 °C)  Max: 98.6 °F (37 °C)   BP  Min: 110/72  Max: 152/95   Pulse  Min: 72  Max: 98   Resp  Min: 16  Max: 18        FHT's: reactive and category 1.  external monitors used   Cervix: was checked (by RN): 0 cm / 50 % / -3   Contractions: regular every 2 minutes  Pitocin at 10    DTR trace, no clonus, 1+ ankle edema      Assessment   IUP at 38w0d  Gestational HTN     Plan   1.   Continue with augmentation  Discussed patient's difficulty with vaginal exam / procedures. Discussed options of early epidural or elective . Patient to consider options while holding pitocin at 10. NPO for now.  Allow labor to continue pending maternal and fetal status  Plan discussed with family and questions answered.  Understanding verbalized.    Jesus Perez MD  2023  07:51 CDT

## 2023-07-25 NOTE — ANESTHESIA PROCEDURE NOTES
Spinal Block      Patient reassessed immediately prior to procedure    Patient location during procedure: OR  Start Time: 7/25/2023 11:50 AM  Stop Time: 7/25/2023 11:54 AM  Indication:at surgeon's request, post-op pain management and procedure for pain  Performed By  CRNA/CAA: Bryant Isaacs CRNA  Preanesthetic Checklist  Completed: patient identified, IV checked, site marked, risks and benefits discussed, surgical consent, monitors and equipment checked, pre-op evaluation and timeout performed  Spinal Block Prep:  Patient Position:sitting  Sterile Tech:cap, gloves, mask and sterile barriers  Prep:DuraPrep  Patient Monitoring:blood pressure monitoring, continuous pulse oximetry and EKG    Spinal Block Procedure  Approach:midline  Guidance:landmark technique and palpation technique  Location:L4-L5  Needle Type:Sprotte  Needle Gauge:24 G  Placement of Spinal needle event:cerebrospinal fluid aspirated  Paresthesia: no  Fluid Appearance:clear     Post Assessment  Patient Tolerance:patient tolerated the procedure well with no apparent complications  Complications no  Additional Notes  Risk and benefits discussed and explained to patients satisfaction, and wishes to proceed,  Pt assisted by RN on sitting on the edge of bed,   Time out performed

## 2023-07-25 NOTE — ANESTHESIA PREPROCEDURE EVALUATION
Anesthesia Evaluation     NPO Solid Status: > 8 hours  NPO Liquid Status: > 8 hours           Airway   Mallampati: II  TM distance: >3 FB  No difficulty expected  Dental - normal exam     Pulmonary - negative pulmonary ROS   Cardiovascular   Exercise tolerance: good (4-7 METS)    (+) hypertension well controlled      Neuro/Psych  (+) headaches, tremors, psychiatric history Anxiety and Depression  GI/Hepatic/Renal/Endo    (+) obesity, morbid obesity    Musculoskeletal     Abdominal    Substance History      OB/GYN    (+) Pregnant, Preeclampsia, pregnancy induced hypertension        Other                      Anesthesia Plan    ASA 2     spinal       Anesthetic plan, risks, benefits, and alternatives have been provided, discussed and informed consent has been obtained with: patient.    CODE STATUS:

## 2023-07-25 NOTE — NURSING NOTE
Patient crying and upset because she no longer feels her contractions. Patient states she is done. Updated Dr Perez on patient situation. Discussed plan of care with Dr Perez and then relayed plan of care with patient and her . They desire to discuss between them whether to continue on day 4 of induction or to have an elective c/s. Patient states she will notify me when she has decided on a plan of action

## 2023-07-25 NOTE — PLAN OF CARE
Goal Outcome Evaluation:   Pitocin IOL started, no change in cervical exam. Wants to avoid an epidural prefers to be able to move during labor. Plans to do skin to skin care and breastfeed.

## 2023-07-25 NOTE — PROGRESS NOTES
Jesus Perez MD  [facility]  [address]  [place] [address]  Office [phone]  Fax [phone]       Reagan Avina  : 1990  MRN: [id number]  CSN: [id number]    Labor progress note    Subjective   She reports is having no problems. She doesn't want to continue with attempts at induction of labor.     Objective   Min/max vitals past 24 hours:  Temp  Min: 97.9 °F (36.6 °C)  Max: 98.6 °F (37 °C)   BP  Min: 110/72  Max: 152/95   Pulse  Min: 72  Max: 98   Resp  Min: 16  Max: 18        FHT's: reactive and category 1.  external monitors used   Cervix: was not checked.   Contractions: regular every 2 minutes      Assessment   IUP at 38w0d  Gestational HTN     Plan   1.   Pitocin off, plan for  around noon    Risks, benefits, and alternatives to  section were discussed with the patient at  length.  The surgical nature of  section was discussed.  The indications for  section were discussed.  Risks of bleeding, infection, and damage to surrounding organs were reviewed.  Injury to blood vessels, the urinary bladder, the ureter, and the intestines were all reviewed.  Management of these complications were reviewed.    All of the patient's questions were answered to her satisfaction.  She verbalized understanding.  She wished to proceed.      Jesus Perez MD  2023  10:19 CDT

## 2023-07-25 NOTE — LACTATION NOTE
"Reported per EDWAR Rodas in NICU, infant brought back to mother at 1730, then received 4.5 ml of previously expressed colostrum. Infant remains with hunger cues, mother requesting lactation assistance for feeding. Lactation unavailable immediately, will follow up when at next available opportunity.    1830  To room 241, mother happily reports infant \"took immediately to breast\", nursed 6 mins on left side but became sleepy and disinterested in continuing. Discussed average feeding amounts, likely between the EBM and time at breast, infant achieved a satisfying feeding. Praise provided. Initial breastfeeding packet reviewed, discussed recommended feeding frequency and duration. Encourage skin to skin feedings, hand expression to provide supplemental drops as well as provide additional stimulation to mother given the delay and limited feedings thus far. Hand pump, collection cups and syringes provided. Parents deny questions at this time. Encouragement and support provided.   "

## 2023-07-26 LAB
BASOPHILS # BLD AUTO: 0.06 10*3/MM3 (ref 0–0.2)
BASOPHILS NFR BLD AUTO: 0.4 % (ref 0–1.5)
DEPRECATED RDW RBC AUTO: 47.2 FL (ref 37–54)
EOSINOPHIL # BLD AUTO: 0.05 10*3/MM3 (ref 0–0.4)
EOSINOPHIL NFR BLD AUTO: 0.3 % (ref 0.3–6.2)
ERYTHROCYTE [DISTWIDTH] IN BLOOD BY AUTOMATED COUNT: 13.9 % (ref 12.3–15.4)
HCT VFR BLD AUTO: 33 % (ref 34–46.6)
HGB BLD-MCNC: 10.6 G/DL (ref 12–15.9)
IMM GRANULOCYTES # BLD AUTO: 0.1 10*3/MM3 (ref 0–0.05)
IMM GRANULOCYTES NFR BLD AUTO: 0.6 % (ref 0–0.5)
LYMPHOCYTES # BLD AUTO: 2.96 10*3/MM3 (ref 0.7–3.1)
LYMPHOCYTES NFR BLD AUTO: 18.6 % (ref 19.6–45.3)
MCH RBC QN AUTO: 30.2 PG (ref 26.6–33)
MCHC RBC AUTO-ENTMCNC: 32.1 G/DL (ref 31.5–35.7)
MCV RBC AUTO: 94 FL (ref 79–97)
MONOCYTES # BLD AUTO: 1.2 10*3/MM3 (ref 0.1–0.9)
MONOCYTES NFR BLD AUTO: 7.6 % (ref 5–12)
NEUTROPHILS NFR BLD AUTO: 11.51 10*3/MM3 (ref 1.7–7)
NEUTROPHILS NFR BLD AUTO: 72.5 % (ref 42.7–76)
NRBC BLD AUTO-RTO: 0 /100 WBC (ref 0–0.2)
PLATELET # BLD AUTO: 198 10*3/MM3 (ref 140–450)
PMV BLD AUTO: 12.2 FL (ref 6–12)
RBC # BLD AUTO: 3.51 10*6/MM3 (ref 3.77–5.28)
WBC NRBC COR # BLD: 15.88 10*3/MM3 (ref 3.4–10.8)

## 2023-07-26 PROCEDURE — 99231 SBSQ HOSP IP/OBS SF/LOW 25: CPT | Performed by: OBSTETRICS & GYNECOLOGY

## 2023-07-26 PROCEDURE — 85025 COMPLETE CBC W/AUTO DIFF WBC: CPT | Performed by: OBSTETRICS & GYNECOLOGY

## 2023-07-26 PROCEDURE — 25010000002 KETOROLAC TROMETHAMINE PER 15 MG: Performed by: OBSTETRICS & GYNECOLOGY

## 2023-07-26 RX ORDER — IBUPROFEN 600 MG/1
600 TABLET ORAL ONCE
Status: COMPLETED | OUTPATIENT
Start: 2023-07-26 | End: 2023-07-26

## 2023-07-26 RX ADMIN — PRENATAL VIT W/ FE FUMARATE-FA TAB 27-0.8 MG 1 TABLET: 27-0.8 TAB at 17:56

## 2023-07-26 RX ADMIN — OXYCODONE HYDROCHLORIDE 5 MG: 5 TABLET ORAL at 10:28

## 2023-07-26 RX ADMIN — ACETAMINOPHEN 1000 MG: 500 TABLET, FILM COATED ORAL at 11:02

## 2023-07-26 RX ADMIN — ACETAMINOPHEN 1000 MG: 500 TABLET, FILM COATED ORAL at 04:36

## 2023-07-26 RX ADMIN — ACETAMINOPHEN 1000 MG: 500 TABLET, FILM COATED ORAL at 00:30

## 2023-07-26 RX ADMIN — KETOROLAC TROMETHAMINE 15 MG: 15 INJECTION, SOLUTION INTRAMUSCULAR; INTRAVENOUS at 08:22

## 2023-07-26 RX ADMIN — ACETAMINOPHEN 650 MG: 325 TABLET ORAL at 17:56

## 2023-07-26 RX ADMIN — IBUPROFEN 600 MG: 600 TABLET, FILM COATED ORAL at 15:27

## 2023-07-26 RX ADMIN — IBUPROFEN 600 MG: 600 TABLET, FILM COATED ORAL at 22:10

## 2023-07-26 RX ADMIN — DOCUSATE SODIUM 100 MG: 100 CAPSULE, LIQUID FILLED ORAL at 07:26

## 2023-07-26 NOTE — PLAN OF CARE
Problem: Adult Inpatient Plan of Care  Goal: Plan of Care Review  Outcome: Ongoing, Progressing  Flowsheets  Taken 7/26/2023 0631 by Ashley Anglin RN  Progress: improving  Outcome Evaluation:   VSS, elevated blood pressure once   denies headache, vision changes, or epigastric pain   FFML @U with scant bleeding   IID'd   ambulated in room   unmeasured void after cath out   pain well controlled with ERAS   complaint of itching throughout shift, offered medication but pt denied    at bedside   bonding well and breastfeeeding well  Taken 7/24/2023 0552 by Kate Liz, RN  Plan of Care Reviewed With:   patient   spouse  Goal: Patient-Specific Goal (Individualized)  Outcome: Ongoing, Progressing  Goal: Absence of Hospital-Acquired Illness or Injury  Outcome: Ongoing, Progressing  Intervention: Identify and Manage Fall Risk  Recent Flowsheet Documentation  Taken 7/26/2023 0516 by Ashley Anglin RN  Safety Promotion/Fall Prevention: safety round/check completed  Taken 7/26/2023 0436 by Ashley Anglin RN  Safety Promotion/Fall Prevention: safety round/check completed  Taken 7/26/2023 0245 by Ashley Anglin RN  Safety Promotion/Fall Prevention: safety round/check completed  Taken 7/26/2023 0130 by Ashley Anglin RN  Safety Promotion/Fall Prevention: safety round/check completed  Taken 7/26/2023 0030 by Ashley Anglin RN  Safety Promotion/Fall Prevention: safety round/check completed  Taken 7/25/2023 2310 by Ashley Anglin RN  Safety Promotion/Fall Prevention: safety round/check completed  Taken 7/25/2023 2145 by Ashley Anglin RN  Safety Promotion/Fall Prevention: safety round/check completed  Taken 7/25/2023 2023 by Ashley Anglin RN  Safety Promotion/Fall Prevention: safety round/check completed  Taken 7/25/2023 1930 by Ashley Anglin RN  Safety Promotion/Fall Prevention: safety round/check completed  Intervention: Prevent and Manage VTE (Venous Thromboembolism) Risk  Recent Flowsheet Documentation  Taken  2023 by Ashley Anglin RN  VTE Prevention/Management:   bilateral   sequential compression devices on  Goal: Optimal Comfort and Wellbeing  Outcome: Ongoing, Progressing  Intervention: Monitor Pain and Promote Comfort  Recent Flowsheet Documentation  Taken 2023 0516 by Ashley Anglin RN  Pain Management Interventions: no interventions per patient request  Taken 2023 0436 by Ashley Anglin RN  Pain Management Interventions: see MAR  Taken 2023 0030 by Ashley Anglin RN  Pain Management Interventions:   see MAR   around-the-clock dosing utilized  Taken 2023 by Ashley Anglin RN  Pain Management Interventions:   see MAR   around-the-clock dosing utilized  Intervention: Provide Person-Centered Care  Recent Flowsheet Documentation  Taken 2023 by Ashley Anglin RN  Trust Relationship/Rapport: care explained  Goal: Readiness for Transition of Care  Outcome: Ongoing, Progressing     Problem: Adjustment to Role Transition (Postpartum  Delivery)  Goal: Successful Maternal Role Transition  Outcome: Ongoing, Progressing     Problem: Bleeding (Postpartum  Delivery)  Goal: Hemostasis  Outcome: Ongoing, Progressing     Problem: Infection (Postpartum  Delivery)  Goal: Absence of Infection Signs and Symptoms  Outcome: Ongoing, Progressing     Problem: Pain (Postpartum  Delivery)  Goal: Acceptable Pain Control  Outcome: Ongoing, Progressing  Intervention: Prevent or Manage Pain  Recent Flowsheet Documentation  Taken 2023 05 by Ashley Anglin RN  Pain Management Interventions: no interventions per patient request  Taken 2023 0436 by Ashley Anglin RN  Pain Management Interventions: see MAR  Taken 2023 0030 by Ashley Anglin RN  Pain Management Interventions:   see MAR   around-the-clock dosing utilized  Taken 2023 by Ashley Anglin RN  Pain Management Interventions:   see MAR   around-the-clock dosing utilized     Problem:  Postoperative Nausea and Vomiting (Postpartum  Delivery)  Goal: Nausea and Vomiting Relief  Outcome: Ongoing, Progressing     Problem: Postoperative Urinary Retention (Postpartum  Delivery)  Goal: Effective Urinary Elimination  Outcome: Ongoing, Progressing     Problem: Breastfeeding  Goal: Effective Breastfeeding  Outcome: Ongoing, Progressing   Goal Outcome Evaluation:           Progress: improving  Outcome Evaluation: VSS, elevated blood pressure once; denies headache, vision changes, or epigastric pain; FFML @U with scant bleeding; IID'd; ambulated in room; unmeasured void after cath out; pain well controlled with ERAS; complaint of itching throughout shift, offered medication but pt denied;  at bedside; bonding well and breastfeeeding well

## 2023-07-26 NOTE — LACTATION NOTE
"Mother's Name: Dania Phone #:[phone]  Infant Name: Aleja  :2024  Gestation:38w0d  Day of life:1  Birth weight:  6-12.3 (3070g) Discharge weight:  Weight Loss: -6.35%  24 hour Summary of Feeds: 4BF ( short and some 1 side only) EBM 4.5 ml Voids: 4 Stools:DTS  Assistive devices (shields, shells, etc):NA  Significant Maternal history:, anemia, GHTN, Anxiety, depression,  first child for 3 months (now 12 yr old)  Maternal Concerns:  denies  Maternal Goal: exclusive breastfeeding for 1 year  Mother's Medications: Vit D3, Unisom, Pepcid, Mg oxide, PNV, Vit B6, Zinc  Breastpump for home: Medela  Ped follow up appt:      F/u with mother to discuss breastfeeding progress. Happily reports infant nursing well, latching well and demonstrates \"clicking\" during feedings. Mother describes clicking as sign of swallows. Upon further assessment, the sound mother is hearing is a clicking sound indicative of loss of latch/loss of tongue anchoring at breast. Discussed how this may be decreasing effectiveness of feedings. Discussed concerns for feeding quality, duration. Infant with high weight loss for 24 hours, keeping in mind this may be some what effected by mother's long induction process and ultimate csection. Feedings recorded are also short (3-11 mins total) and minimal EBM provided over total first 24 hours. Infant has had 4 voids but remains due to stool at 22 hours old.     Offered assistance with feeding now, as mother reports she is preparing to attempt to feed infant. Mother declines assistance and states she desires to attempt feeding independently. Educated on skin to skin feedings, keeping infant awake and active for feedings, compressing breast during feedings. Performing hand expression before and after feedings to provide all EBM as possible. Also educated on finger suck training attempts with infant if Clicking sound occurs and does not self correct after 1-2 sucks. Strongly encourage " utilizing lactation staff for assistance as needed. Questions denied at this time. Encouragement and support provided.     Instructed mom our lactation team is here for continued support throughout their breastfeeding journey. Our team has encouraged mom to call with any questions or concerns that may arise after discharge.

## 2023-07-26 NOTE — PROGRESS NOTES
Jesus Perez MD  [facility]  [address]  [place] [address]  Office [phone]  Fax [phone]      [facility]   PROGRESS NOTE    Post-Op Day 1 S/P   Subjective     Patient reports:  Pain is well controlled with ERAS protocol.  She is ambulating. Tolerating diet. Voiding - without difficulty; flatus reported..  Vaginal bleeding is as much as expected.      Objective      Vitals: Vital Signs Range for the last 24 hours  Temperature: Temp:  [96.8 °F (36 °C)-98.3 °F (36.8 °C)] 97.2 °F (36.2 °C)   Temp Source: Temp src: Temporal   BP: BP: (107-145)/(66-97) 128/86   Pulse: Heart Rate:  [60-93] 76   Respirations: Resp:  [16-18] 18   SPO2: SpO2:  [95 %-100 %] 99 %   O2 Amount (l/min):     O2 Devices Device (Oxygen Therapy): room air   Weight:              Physical Exam    Lungs clear to auscultation bilaterally   Abdomen Soft, approp tender   Incision  healing well, no erythema, no swelling   Extremities edema 2+ and Homans sign is negative, no sign of DVT     I reviewed the patient's new clinical results.  Lab Results (last 24 hours)       Procedure Component Value Units Date/Time    CBC & Differential [[id number]]  (Abnormal) Collected: 23    Specimen: Blood Updated: 23    Narrative:      The following orders were created for panel order CBC & Differential.  Procedure                               Abnormality         Status                     ---------                               -----------         ------                     CBC Auto Differential[[id number]]        Abnormal            Final result                 Please view results for these tests on the individual orders.    CBC Auto Differential [[id number]]  (Abnormal) Collected: 23    Specimen: Blood Updated: 23     WBC 15.88 10*3/mm3      RBC 3.51 10*6/mm3      Hemoglobin 10.6 g/dL      Hematocrit 33.0 %      MCV 94.0 fL      MCH 30.2 pg      MCHC 32.1 g/dL      RDW 13.9 %       RDW-SD 47.2 fl      MPV 12.2 fL      Platelets 198 10*3/mm3      Neutrophil % 72.5 %      Lymphocyte % 18.6 %      Monocyte % 7.6 %      Eosinophil % 0.3 %      Basophil % 0.4 %      Immature Grans % 0.6 %      Neutrophils, Absolute 11.51 10*3/mm3      Lymphocytes, Absolute 2.96 10*3/mm3      Monocytes, Absolute 1.20 10*3/mm3      Eosinophils, Absolute 0.05 10*3/mm3      Basophils, Absolute 0.06 10*3/mm3      Immature Grans, Absolute 0.10 10*3/mm3      nRBC 0.0 /100 WBC             External Prenatal Results       Pregnancy Outside Results - Transcribed From Office Records - See Scanned Records For Details       Test Value Date Time    ABO  B  07/22/23 2251    Rh  Positive  07/22/23 2251    Antibody Screen  Negative  07/22/23 2251       Negative  01/06/23 0830    Varicella IgG       Rubella  2.35 index 01/06/23 0830    Hgb  10.6 g/dL 07/26/23 0534       10.8 g/dL 07/22/23 2251       12.5 g/dL 05/16/23 0930       14.1 g/dL 01/06/23 0830    Hct  33.0 % 07/26/23 0534       34.0 % 07/22/23 2251       41.4 % 01/06/23 0830    Glucose Fasting GTT       Glucose Tolerance Test 1 hour       Glucose Tolerance Test 3 hour       Gonorrhea (discrete)  Negative  07/12/23 0931       Negative  01/06/23 0830    Chlamydia (discrete)  Negative  07/12/23 0931       Negative  01/06/23 0830    RPR  Non Reactive  01/06/23 0830    VDRL       Syphilis Antibody       HBsAg  Negative  01/06/23 0830    Herpes Simplex Virus PCR       Herpes Simplex VIrus Culture       HIV  Non Reactive  01/06/23 0830    Hep C RNA Quant PCR       Hep C Antibody  <0.1 s/co ratio 01/06/23 0830    AFP       Group B Strep  Negative  07/12/23 0931    GBS Susceptibility to Clindamycin       GBS Susceptibility to Erythromycin       Fetal Fibronectin       Genetic Testing, Maternal Blood                 Drug Screening       Test Value Date Time    Urine Drug Screen       Amphetamine Screen       Barbiturate Screen       Benzodiazepine Screen       Methadone Screen        Phencyclidine Screen       Opiates Screen       THC Screen       Cocaine Screen       Propoxyphene Screen       Buprenorphine Screen       Methamphetamine Screen       Oxycodone Screen       Tricyclic Antidepressants Screen                 Legend    ^: Historical                            Assessment & Plan        Gestational hypertension, third trimester      Assessment:    Dania Avina is Day 1  post-partum    , Low Transverse     .       Plan:  remove dressing, saline lock IV fluids, advance diet  normal diet as tolerated, and continue post op care.        Jesus Perez MD  2023  12:00 CDT

## 2023-07-26 NOTE — PLAN OF CARE
Goal Outcome Evaluation:           Progress: improving  Outcome Evaluation: VSS, slightly elevated BP noted, CNM aware, voided, bowel movement, medicated with PRN pain meds x 1, otherwise tolerating scheduled pain meds

## 2023-07-26 NOTE — ANESTHESIA POSTPROCEDURE EVALUATION
"Patient: Dania Avina    Procedure Summary       Date: 23 Room / Location: [facility] LABOR DELIVERY 1 / [facility] DELIVERY    Anesthesia Start: 1148 Anesthesia Stop: 1249    Procedure:  SECTION PRIMARY (Bilateral: Abdomen) Diagnosis:     Surgeons: Jesus Perez MD Provider: Bryant Isaacs CRNA    Anesthesia Type: spinal ASA Status: 2            Anesthesia Type: spinal    Vitals  Vitals Value Taken Time   /86 23 0700   Temp 97.2 °F (36.2 °C) 23 0700   Pulse 76 23 0700   Resp 18 23 0700   SpO2 99 % 23 07           Post Anesthesia Care and Evaluation    Patient location during evaluation: bedside  Patient participation: complete - patient participated  Level of consciousness: awake and alert  Pain management: adequate    Airway patency: patent  Anesthetic complications: No anesthetic complications    Cardiovascular status: acceptable  Respiratory status: acceptable  Hydration status: acceptable  Post Neuraxial Block status: Motor and sensory function returned to baseline and No signs or symptoms of PDPH  Comments: Blood pressure 128/86, pulse 76, temperature 97.2 °F (36.2 °C), temperature source Temporal, resp. rate 18, height 160 cm (63\"), weight 93.9 kg (207 lb), last menstrual period 2022, SpO2 99 %, currently breastfeeding.      "

## 2023-07-27 ENCOUNTER — PATIENT OUTREACH (OUTPATIENT)
Dept: LABOR AND DELIVERY | Facility: HOSPITAL | Age: 33
End: 2023-07-27
Payer: MEDICAID

## 2023-07-27 ENCOUNTER — TELEPHONE (OUTPATIENT)
Dept: OBSTETRICS AND GYNECOLOGY | Facility: CLINIC | Age: 33
End: 2023-07-27
Payer: MEDICAID

## 2023-07-27 VITALS
WEIGHT: 207 LBS | TEMPERATURE: 97.4 F | HEART RATE: 78 BPM | DIASTOLIC BLOOD PRESSURE: 72 MMHG | HEIGHT: 63 IN | OXYGEN SATURATION: 97 % | BODY MASS INDEX: 36.68 KG/M2 | SYSTOLIC BLOOD PRESSURE: 113 MMHG | RESPIRATION RATE: 16 BRPM

## 2023-07-27 PROCEDURE — 99238 HOSP IP/OBS DSCHRG MGMT 30/<: CPT | Performed by: OBSTETRICS & GYNECOLOGY

## 2023-07-27 RX ORDER — OXYCODONE HYDROCHLORIDE 5 MG/1
5 TABLET ORAL EVERY 6 HOURS PRN
Qty: 8 TABLET | Refills: 0 | Status: SHIPPED | OUTPATIENT
Start: 2023-07-27 | End: 2023-08-01

## 2023-07-27 RX ADMIN — DOCUSATE SODIUM 100 MG: 100 CAPSULE, LIQUID FILLED ORAL at 04:42

## 2023-07-27 RX ADMIN — ACETAMINOPHEN 650 MG: 325 TABLET ORAL at 00:36

## 2023-07-27 RX ADMIN — IBUPROFEN 600 MG: 600 TABLET, FILM COATED ORAL at 04:38

## 2023-07-27 RX ADMIN — PRENATAL VIT W/ FE FUMARATE-FA TAB 27-0.8 MG 1 TABLET: 27-0.8 TAB at 10:37

## 2023-07-27 RX ADMIN — IBUPROFEN 600 MG: 600 TABLET, FILM COATED ORAL at 10:38

## 2023-07-27 RX ADMIN — ACETAMINOPHEN 650 MG: 325 TABLET ORAL at 13:47

## 2023-07-27 RX ADMIN — ACETAMINOPHEN 650 MG: 325 TABLET ORAL at 06:42

## 2023-07-27 NOTE — OUTREACH NOTE
Motherhood Connection  IP Postpartum    Questions/Answers      Flowsheet Row Responses   Best Method for Contacting Cell   Support Person Present Yes   Does the patient have a car seat at the hospital Yes   Delivery Note Reviewed Reviewed   Were birth expectations met? Yes   Is there a need for additional support/resources? No   Lactation Note Reviewed Reviewed   Is additional support needed? No   Any questions or concerns? No   Is the patient going to use Meds to Beds? Yes   Any concerns related discharge meds/ability to  prescriptions? No   OB Discharge Navigator Reviewed  Reviewed   Confirm Postpartum OB appointment Yes   Postpartum OB appointment date 23   Confirm initial well-child Pediatrician appointment date/time: Yes   Initial Well Child Pediatrician Appointment Date 23   Additional post-discharge F/U appointments Yes   Post-Discharge F/U appointments details Lactation 2023 @ 1100   Does patient have transportation to appointments? Yes   Any other assistance needed to ensure she is able to attend appointments? No   Does patient have supplies needed at home for  care? Breast Pump, Clothing, Crib, Diapers, Formula          At client's bedside.  Client states she has support and all necessary items to take care of baby and herself.  Postpartum check-in appt for  and reminder card given to client.  [facility] reminder also given for client to call and add baby to feeding plan.     Desire Dove RN  Maternity Nurse Navigator    2023, 11:16 CDT

## 2023-07-27 NOTE — PROGRESS NOTES
Jesus Perez MD  [facility]  [address]  [place] [address]  Office [phone]  Fax [phone]      [facility]   PROGRESS NOTE    Post-Op Day 2 S/P   Subjective     Patient reports:  Pain is well controlled with ERAS protocol.  She is ambulating. Tolerating diet. Voiding - without difficulty; flatus reported..  Vaginal bleeding is as much as expected.      Objective      Vitals: Vital Signs Range for the last 24 hours  Temperature: Temp:  [97.4 °F (36.3 °C)-98.3 °F (36.8 °C)] 97.4 °F (36.3 °C)   Temp Source: Temp src: Temporal   BP: BP: (113-148)/(72-98) 113/72   Pulse: Heart Rate:  [] 78   Respirations: Resp:  [16-18] 16   SPO2: SpO2:  [97 %-99 %] 97 %   O2 Amount (l/min):     O2 Devices Device (Oxygen Therapy): room air   Weight:              Physical Exam    Lungs clear to auscultation bilaterally   Abdomen Soft, approp tender   Incision  healing well, no erythema, no swelling, well approximated   Extremities edema 2+ and Homans sign is negative, no sign of DVT     None  Lab Results (last 24 hours)       ** No results found for the last 24 hours. **            External Prenatal Results       Pregnancy Outside Results - Transcribed From Office Records - See Scanned Records For Details       Test Value Date Time    ABO  B  23    Rh  Positive  23    Antibody Screen  Negative  23       Negative  23 0830    Varicella IgG       Rubella  2.35 index 23 0830    Hgb  10.6 g/dL 23 0534       10.8 g/dL 23       12.5 g/dL 23 0930       14.1 g/dL 23 0830    Hct  33.0 % 23 0534       34.0 % 23       41.4 % 23 0830    Glucose Fasting GTT       Glucose Tolerance Test 1 hour       Glucose Tolerance Test 3 hour       Gonorrhea (discrete)  Negative  23 0931       Negative  23 08    Chlamydia (discrete)  Negative  23 0931       Negative  23 0830    RPR  Non  Reactive  23 0830    VDRL       Syphilis Antibody       HBsAg  Negative  23 0830    Herpes Simplex Virus PCR       Herpes Simplex VIrus Culture       HIV  Non Reactive  23 0830    Hep C RNA Quant PCR       Hep C Antibody  <0.1 s/co ratio 23 0830    AFP       Group B Strep  Negative  23 0931    GBS Susceptibility to Clindamycin       GBS Susceptibility to Erythromycin       Fetal Fibronectin       Genetic Testing, Maternal Blood                 Drug Screening       Test Value Date Time    Urine Drug Screen       Amphetamine Screen       Barbiturate Screen       Benzodiazepine Screen       Methadone Screen       Phencyclidine Screen       Opiates Screen       THC Screen       Cocaine Screen       Propoxyphene Screen       Buprenorphine Screen       Methamphetamine Screen       Oxycodone Screen       Tricyclic Antidepressants Screen                 Legend    ^: Historical                            Assessment & Plan        Gestational hypertension, third trimester      Assessment:    Dania Avina is Day 2  post-partum    , Low Transverse     .       Plan:  plan for discharge today.        Jesus Perez MD  2023  07:50 CDT

## 2023-07-27 NOTE — DISCHARGE SUMMARY
Jesus Perez MD  [facility]  [address]  [place] [id number]  Office [phone]  Fax [phone]    Discharge Summary     Reagan Avina  : 1990  MRN: [id number]  CSN: [id number]    Date of Admission: 2023   Date of Discharge:  2023   Delivering Physician: Jesus Perez MD       Admission Diagnosis: Threatened labor [O47.9]   Discharge Diagnosis: Pregnancy at 38w0d - delivered       Procedures: Repeat  (LTCS)     Hospital Course  See the completed operative report for details regarding antepartum course and delivery.    Her postoperative course was unremarkable.  On POD # 2 she felt like she was ready for discharge.  She was evaluated by Dr. Perez who agreed she was able to be discharged to home.  She had no febrile morbidity. She had normal bowel and bladder function and was hemodynamically stable.  Her wound was healing well without obvious signs of infections.    Infant  female  fetus weighing 3070 g (6 lb 12.3 oz)   Apgars -  7 @ 1 minute /  8 @ 5 minutes.    Discharge labs  Lab Results   Component Value Date    WBC 15.88 (H) 2023    HGB 10.6 (L) 2023    HCT 33.0 (L) 2023     2023       Discharge Medications     Discharge Medications        New Medications        Instructions Start Date   oxyCODONE 5 MG immediate release tablet  Commonly known as: ROXICODONE   5 mg, Oral, Every 6 Hours PRN             Continue These Medications        Instructions Start Date   Breast Pump misc   1 each, Does not apply, As Needed      COMPLETE PRENATAL/DHA PO   1 tablet, Oral, Daily      docusate sodium 100 MG capsule   100 mg, Oral      doxylamine 25 MG tablet  Commonly known as: UNISOM   25 mg, Oral, Nightly PRN      famotidine 20 MG tablet  Commonly known as: PEPCID   20 mg, Oral, 2 Times Daily      magnesium oxide 400 MG tablet  Commonly known as: MAG-OX   Take 1 tablet by mouth every  night.      Vitamin B6 100 MG tablet   1 tablet, Oral, Nightly      VITAMIN D3 PO   Oral      Zinc 50 MG capsule   1 capsule, Oral, Daily               External Prenatal Results       Pregnancy Outside Results - Transcribed From Office Records - See Scanned Records For Details       Test Value Date Time    ABO  B  07/22/23 2251    Rh  Positive  07/22/23 2251    Antibody Screen  Negative  07/22/23 2251       Negative  01/06/23 0830    Varicella IgG       Rubella  2.35 index 01/06/23 0830    Hgb  10.6 g/dL 07/26/23 0534       10.8 g/dL 07/22/23 2251       12.5 g/dL 05/16/23 0930       14.1 g/dL 01/06/23 0830    Hct  33.0 % 07/26/23 0534       34.0 % 07/22/23 2251       41.4 % 01/06/23 0830    Glucose Fasting GTT       Glucose Tolerance Test 1 hour       Glucose Tolerance Test 3 hour       Gonorrhea (discrete)  Negative  07/12/23 0931       Negative  01/06/23 0830    Chlamydia (discrete)  Negative  07/12/23 0931       Negative  01/06/23 0830    RPR  Non Reactive  01/06/23 0830    VDRL       Syphilis Antibody       HBsAg  Negative  01/06/23 0830    Herpes Simplex Virus PCR       Herpes Simplex VIrus Culture       HIV  Non Reactive  01/06/23 0830    Hep C RNA Quant PCR       Hep C Antibody  <0.1 s/co ratio 01/06/23 0830    AFP       Group B Strep  Negative  07/12/23 0931    GBS Susceptibility to Clindamycin       GBS Susceptibility to Erythromycin       Fetal Fibronectin       Genetic Testing, Maternal Blood                 Drug Screening       Test Value Date Time    Urine Drug Screen       Amphetamine Screen       Barbiturate Screen       Benzodiazepine Screen       Methadone Screen       Phencyclidine Screen       Opiates Screen       THC Screen       Cocaine Screen       Propoxyphene Screen       Buprenorphine Screen       Methamphetamine Screen       Oxycodone Screen       Tricyclic Antidepressants Screen                 Legend    ^: Historical                            Discharge Disposition Home or Self Care    Condition on Discharge: good   Follow-up: 2 weeks with Ana Perez MD  7/27/2023

## 2023-07-27 NOTE — PLAN OF CARE
Goal Outcome Evaluation:              Outcome Evaluation: Pt ready for d/c home per MD order.

## 2023-07-27 NOTE — LACTATION NOTE
Mother's Name: Dania Phone #:[phone]  Infant Name: Aleja  :2023  Gestation:38w0d  Day of life: 2  Birth weight:  6-12.3 (3070g) Discharge weight: 6-1.2 (2755g)  Weight Loss: -10.26%  24 hour Summary of Feeds: 6BF ( short and some 1 side only)  Voids: 5 Stools: 6  Assistive devices (shields, shells, etc):NA  Significant Maternal history:, anemia, GHTN, Anxiety, depression,  first child for 3 months (now 12 yr old)  Maternal Concerns:  denies  Maternal Goal: exclusive breastfeeding for 1 year  Mother's Medications: Vit D3, Unisom, Pepcid, Mg oxide, PNV, Vit B6, Zinc  Breastpump for home: Medela  Ped follow up appt: [facility] Friday, 23 at 11:00 AM, Dr. Valdivia on Aug. 8th @ 11:20 AM    Infant at right breast in modified football hold actively nursing. Reviewed discharge breastfeeding teaching and offered recommendations to improve comfort of nipple. Infant released breast when adjusting positioning for head tilt bringing chest into mother. Nipple pinched with white stripe. Patient reports this is the first time her nipple has looked like this. Explained cause of pinch and pain and how interventions could help both comfort and milk transfer. Assisted with waking infant, positioning, and latching with improved comfort for patient. Infant demonstrated non-nutritive and nutritive sucks with deep jaw drop. Offered continued assistance and support as needed.     Instructed mom our lactation team is here for continued support throughout their breastfeeding journey. Our team has encouraged mom to call with any questions or concerns that may arise after discharge.

## 2023-07-28 ENCOUNTER — HOSPITAL ENCOUNTER (OUTPATIENT)
Dept: LACTATION | Facility: HOSPITAL | Age: 33
End: 2023-07-28
Payer: MEDICAID

## 2023-07-28 NOTE — PAYOR COMM NOTE
"REF:   [id number]     [facility]  FAX  [phone]     Froylan Avina (33 y.o. Female)       Date of Birth   1990    Social Security Number       Address   [address] [address]    Home Phone   [phone]    MRN   [id number]       Gnosticist   Orthodox    Marital Status                               Admission Date   23    Admission Type   Elective    Admitting Provider   Jesus Perez MD    Attending Provider       Department, Room/Bed   [facility] MOTHER BABY 2A, M241/1       Discharge Date   2023    Discharge Disposition   Home or Self Care    Discharge Destination                                 Attending Provider: (none)   Allergies: Phenergan [Promethazine Hcl], Wasp Venom    Isolation: None   Infection: None   Code Status: Prior    Ht: 160 cm (63\")   Wt: 93.9 kg (207 lb)    Admission Cmt: None   Principal Problem: Gestational hypertension, third trimester [O13.3]                   Active Insurance as of 2023       Primary Coverage       Payor Plan Insurance Group Employer/Plan Group    ANTH MEDICAID [facility] MEDICAID [id number]       Payor Plan Address Payor Plan Phone Number Payor Plan Fax Number Effective Dates    [address] [address] [phone]  2022 - None Entered    [facility] [id number]         Subscriber Name Subscriber Birth Date Member ID       FROYLAN AVINA 1990 [id number]                     Emergency Contacts        (Rel.) Home Phone Work Phone Mobile Phone    JEANETTE AVINA (Spouse) -- -- [phone]                 Discharge Summary        Jesus Perez MD at 23 0753                                                  Jesus Perez MD  [facility]  [address]  [place] [address]  Office [phone]  Fax [phone]    Discharge Summary     Reagan Avina  : 1990  MRN: [id number]  CSN: [id number]    Date of Admission: 2023   Date of " Discharge:  2023   Delivering Physician: Jesus Perez MD       Admission Diagnosis: Threatened labor [O47.9]   Discharge Diagnosis: Pregnancy at 38w0d - delivered       Procedures: Repeat  (LTCS)     Hospital Course  See the completed operative report for details regarding antepartum course and delivery.    Her postoperative course was unremarkable.  On POD # 2 she felt like she was ready for discharge.  She was evaluated by Dr. Perez who agreed she was able to be discharged to home.  She had no febrile morbidity. She had normal bowel and bladder function and was hemodynamically stable.  Her wound was healing well without obvious signs of infections.    Infant  female  fetus weighing 3070 g (6 lb 12.3 oz)   Apgars -  7 @ 1 minute /  8 @ 5 minutes.    Discharge labs  Lab Results   Component Value Date    WBC 15.88 (H) 2023    HGB 10.6 (L) 2023    HCT 33.0 (L) 2023     2023       Discharge Medications     Discharge Medications        New Medications        Instructions Start Date   oxyCODONE 5 MG immediate release tablet  Commonly known as: ROXICODONE   5 mg, Oral, Every 6 Hours PRN             Continue These Medications        Instructions Start Date   Breast Pump misc   1 each, Does not apply, As Needed      COMPLETE PRENATAL/DHA PO   1 tablet, Oral, Daily      docusate sodium 100 MG capsule   100 mg, Oral      doxylamine 25 MG tablet  Commonly known as: UNISOM   25 mg, Oral, Nightly PRN      famotidine 20 MG tablet  Commonly known as: PEPCID   20 mg, Oral, 2 Times Daily      magnesium oxide 400 MG tablet  Commonly known as: MAG-OX   Take 1 tablet by mouth every night.      Vitamin B6 100 MG tablet   1 tablet, Oral, Nightly      VITAMIN D3 PO   Oral      Zinc 50 MG capsule   1 capsule, Oral, Daily               External Prenatal Results       Pregnancy Outside Results - Transcribed From Office Records - See Scanned Records For Details       Test Value Date Time     ABO  B  07/22/23 2251    Rh  Positive  07/22/23 2251    Antibody Screen  Negative  07/22/23 2251       Negative  01/06/23 0830    Varicella IgG       Rubella  2.35 index 01/06/23 0830    Hgb  10.6 g/dL 07/26/23 0534       10.8 g/dL 07/22/23 2251       12.5 g/dL 05/16/23 0930       14.1 g/dL 01/06/23 0830    Hct  33.0 % 07/26/23 0534       34.0 % 07/22/23 2251       41.4 % 01/06/23 0830    Glucose Fasting GTT       Glucose Tolerance Test 1 hour       Glucose Tolerance Test 3 hour       Gonorrhea (discrete)  Negative  07/12/23 0931       Negative  01/06/23 0830    Chlamydia (discrete)  Negative  07/12/23 0931       Negative  01/06/23 0830    RPR  Non Reactive  01/06/23 0830    VDRL       Syphilis Antibody       HBsAg  Negative  01/06/23 0830    Herpes Simplex Virus PCR       Herpes Simplex VIrus Culture       HIV  Non Reactive  01/06/23 0830    Hep C RNA Quant PCR       Hep C Antibody  <0.1 s/co ratio 01/06/23 0830    AFP       Group B Strep  Negative  07/12/23 0931    GBS Susceptibility to Clindamycin       GBS Susceptibility to Erythromycin       Fetal Fibronectin       Genetic Testing, Maternal Blood                 Drug Screening       Test Value Date Time    Urine Drug Screen       Amphetamine Screen       Barbiturate Screen       Benzodiazepine Screen       Methadone Screen       Phencyclidine Screen       Opiates Screen       THC Screen       Cocaine Screen       Propoxyphene Screen       Buprenorphine Screen       Methamphetamine Screen       Oxycodone Screen       Tricyclic Antidepressants Screen                 Legend    ^: Historical                            Discharge Disposition Home or Self Care   Condition on Discharge: good   Follow-up: 2 weeks with Ana Perez MD  7/27/2023        Electronically signed by Jesus Perez MD at 07/27/23 0754       Discharge Order (From admission, onward)       Start     Ordered    07/27/23 0752  Discharge patient  Once        Expected  Discharge Date: 07/27/23   Discharge Disposition: Home or Self Care   Physician of Record for Attribution - Please select from Treatment Team: ABISAI JOLLEY [[id number]]   Review needed by CMO to determine Physician of Record: No      Question Answer Comment   Physician of Record for Attribution - Please select from Treatment Team ABISAI JOLLEY    Review needed by CMO to determine Physician of Record No        07/27/23 0752

## 2023-08-01 ENCOUNTER — PATIENT OUTREACH (OUTPATIENT)
Dept: LABOR AND DELIVERY | Facility: HOSPITAL | Age: 33
End: 2023-08-01
Payer: MEDICAID

## 2023-08-01 NOTE — PROGRESS NOTES
"Enter Query Response Below      Query Response: Gestational Hypertension is the correct diagnosis             If applicable, please update the problem list.   Patient: Dania Avina        : 1990  Account: [id number]           Admit Date: 2023        How to Respond to this query:       a. Click New Note     b. Answer query within the yellow box.                c. Update the Problem List, if applicable.    Dr. Perez,    33 y.o.  at 37w5d presented \"with headache and increased swelling and 2 beats of clonus.  Patient had preeclampsia with first pregnancy.  Labs on admission were normal, but she qualifies for diagnosis of preeclampsia based on BP and symptoms.\" per H&P.  /87 ( 2215) and 146/102 ( 0800). \"Gestational hypertension\" per the - PN's. The patient delivered a viable infant via  on  with a \"Pre-op Diagnosis of Gestational hypertension.\" Discharge summary with "Her postoperative course was unremarkable."    Can you please clarify the conflicting documentation?  -Pre-eclampsia  -Gestational hypertension  -Other-specify ______  -Unable to determine    By submitting this query, we are merely seeking further clarification of documentation to accurately reflect all conditions that you are monitoring, evaluating, treating or that extend the hospitalization or utilize additional resources of care. Please utilize your independent clinical judgment when addressing the question(s) above.     This query and your response, once completed, will be entered into the legal medical record.    Sincerely,  Magalys Pereira RN, BSN  [email or web address]  Clinical Documentation Integrity Program   "

## 2023-08-03 ENCOUNTER — PATIENT OUTREACH (OUTPATIENT)
Dept: LABOR AND DELIVERY | Facility: HOSPITAL | Age: 33
End: 2023-08-03
Payer: MEDICAID

## 2023-08-04 ENCOUNTER — PATIENT OUTREACH (OUTPATIENT)
Dept: LABOR AND DELIVERY | Facility: HOSPITAL | Age: 33
End: 2023-08-04
Payer: MEDICAID

## 2023-08-07 ENCOUNTER — TELEPHONE (OUTPATIENT)
Dept: OBSTETRICS AND GYNECOLOGY | Facility: CLINIC | Age: 33
End: 2023-08-07
Payer: MEDICAID

## 2023-08-07 NOTE — TELEPHONE ENCOUNTER
Pt called asking about her postpartum bleeding. Pt states she has had minimal bleeding since delivery however today has had more of a heavy period. Pt advised bleeding can fluctuate after delivery. Pt advised to go to ER if she starts bleeding through more than a pad an hour of having any severe pain. Pt voiced understanding

## 2023-08-11 ENCOUNTER — PATIENT OUTREACH (OUTPATIENT)
Dept: CALL CENTER | Facility: HOSPITAL | Age: 33
End: 2023-08-11
Payer: MEDICAID

## 2023-08-11 ENCOUNTER — POSTPARTUM VISIT (OUTPATIENT)
Dept: OBSTETRICS AND GYNECOLOGY | Facility: CLINIC | Age: 33
End: 2023-08-11
Payer: MEDICAID

## 2023-08-11 VITALS
HEIGHT: 63 IN | BODY MASS INDEX: 31.54 KG/M2 | SYSTOLIC BLOOD PRESSURE: 122 MMHG | DIASTOLIC BLOOD PRESSURE: 90 MMHG | WEIGHT: 178 LBS

## 2023-08-11 DIAGNOSIS — Z09 POSTOP CHECK: Primary | ICD-10-CM

## 2023-08-11 PROBLEM — O99.210 OBESITY AFFECTING PREGNANCY: Status: RESOLVED | Noted: 2023-03-23 | Resolved: 2023-08-11

## 2023-08-11 PROBLEM — O09.299 HISTORY OF PRE-ECLAMPSIA IN PRIOR PREGNANCY, CURRENTLY PREGNANT: Status: RESOLVED | Noted: 2023-04-30 | Resolved: 2023-08-11

## 2023-08-11 PROCEDURE — 99024 POSTOP FOLLOW-UP VISIT: CPT | Performed by: OBSTETRICS & GYNECOLOGY

## 2023-08-11 NOTE — PROGRESS NOTES
"Dania Avina is a 33 y.o. female here today for incision check after undergoing  on .  She has been doing well since her discharge from the hospital and denies fevers, significant abdominal pain, nausea, or problems with her incision.  Her infant is breast-feeding well and she denies postpartum blues.    /90 (BP Location: Right arm, Patient Position: Sitting)   Ht 160 cm (63\")   Wt 80.7 kg (178 lb)   LMP 2022   Breastfeeding Yes   BMI 31.53 kg/mý   In general pleasant female no acute distress  Abdomen soft and nontender  Her surgical taping is removed and her incision is healing well without signs of infection    Assessment: Normal incision check after a     She will continue with light activities and pelvic rest.  She will followup in 4 weeks for a postpartum visit and call in the meantime if she has any questions or concerns.   "

## 2023-08-11 NOTE — OUTREACH NOTE
Motherhood Connection Survey      Flowsheet Row Responses   [facility] patient discharged from? [place]   Week 1 attempt successful? Yes   Call start time 1553   Call end time 1559   Baby sex Girl    discharged home with mother? Yes   Baby sex Girl   Delivery type    Emotional state Acceptance   Family support Yes   Do you have all necessary resources to care for you and your baby?  Yes   Have members of your household adjusted to your baby? Yes   Did you have any problems with pre-eclampsia during this pregnancy? No   Did you have blood glucose issues during this pregnancy No   Lochia amount Light   Lochia per patient report Rubra   Did you have an episiotomy/tear/abdominal incision? Yes   Feeding Method Breast   Frequency q 2-4 hrs   Duration 20-30 min   Pumping Yes   Storage of Milk freezer   Breast Condition No   Nipple Condition No   Nursing Interventions Lactation education provided   Signs baby is ready to eat Rooting, Crying   Feeding tolerance Adequate pause for breath   Number of wet diapers x 24 hours 7-8   Last BM x 24 hours 7   Umbilical Cord No reported signs or symptoms   Umbilical cord comments cord off   Where does the baby usually sleep? Bassinet   Are there stuffed animals, toys, pillows, quilts, blankets, wedges, positioners, bumpers or other loose bedding in the infant's sleeping environment? No   Does the baby ever share a sleep surface in a bed, couch, recliner or other? No   What position do you lay your baby down to sleep? Back   Are you and/or other caregivers smoking inside or outside the baby's home? No   Mom appointment comments: OB PP f/u done   Baby appointment comments: Peds visits x1, gaining wt, passed BW   Call completed? Yes   How satisfied were you with the Motherhood Connection Program? 5              Lia RAYA - Registered Nurse

## 2023-09-11 ENCOUNTER — POSTPARTUM VISIT (OUTPATIENT)
Dept: OBSTETRICS AND GYNECOLOGY | Facility: CLINIC | Age: 33
End: 2023-09-11
Payer: MEDICAID

## 2023-09-11 VITALS
DIASTOLIC BLOOD PRESSURE: 84 MMHG | SYSTOLIC BLOOD PRESSURE: 118 MMHG | WEIGHT: 184 LBS | HEIGHT: 63 IN | BODY MASS INDEX: 32.6 KG/M2

## 2023-09-11 DIAGNOSIS — Z30.011 ENCOUNTER FOR INITIAL PRESCRIPTION OF CONTRACEPTIVE PILLS: ICD-10-CM

## 2023-09-11 RX ORDER — DROSPIRENONE AND ETHINYL ESTRADIOL 0.02-3(28)
1 KIT ORAL DAILY
Qty: 28 TABLET | Refills: 12 | Status: SHIPPED | OUTPATIENT
Start: 2023-09-11

## 2023-09-11 NOTE — PROGRESS NOTES
"Dania Avina is here for a postpartum visit after a  6 weeks ago.  The depression questionnaire has been completed.  She has no signs of postpartum depression today.  She has not had a period since her delivery and is breast-feeding her infant without difficulty.  Her last Pap smear was 2022 and ASC-H.  She has a history of mild cervical dysplasia.  She would like OCPs for contraception.    /84 (BP Location: Left arm, Patient Position: Sitting)   Ht 160 cm (63\")   Wt 83.5 kg (184 lb)   Breastfeeding Yes   BMI 32.59 kg/m²    In general pleasant female no acute distress  Neck no thyromegaly  Abdomen soft and nontender, the incision is well healed  A Pap smear was not performed.     Assessment: Normal postpartum exam.    We have discussed current Pap smear screening guidelines. I have given her a prescription for a birth control pill and we have discussed common side effects and adverse events. Dania will return in 2-3 months for Pap smear,  or sooner if needed.  "

## 2023-12-14 ENCOUNTER — TELEPHONE (OUTPATIENT)
Dept: OBSTETRICS AND GYNECOLOGY | Facility: CLINIC | Age: 33
End: 2023-12-14
Payer: MEDICAID

## 2023-12-14 NOTE — TELEPHONE ENCOUNTER
Pt has an appt scheduled tomorrow as a f/u on her birth control as well as her annual. Pt states she started her cycle yesterday and is asking if we would still be able to perform her pap smear. Pt advised that if her bleeding is light, we would still do the pap but if she is having heavier bleeding, we would schedule her pap for another day. Pt voiced understanding

## 2023-12-15 ENCOUNTER — OFFICE VISIT (OUTPATIENT)
Dept: OBSTETRICS AND GYNECOLOGY | Facility: CLINIC | Age: 33
End: 2023-12-15
Payer: MEDICAID

## 2023-12-15 VITALS
DIASTOLIC BLOOD PRESSURE: 74 MMHG | WEIGHT: 185 LBS | BODY MASS INDEX: 32.78 KG/M2 | HEIGHT: 63 IN | SYSTOLIC BLOOD PRESSURE: 100 MMHG

## 2023-12-15 DIAGNOSIS — Z78.9 BREASTFEEDING (INFANT): ICD-10-CM

## 2023-12-15 DIAGNOSIS — Z30.41 ENCOUNTER FOR SURVEILLANCE OF CONTRACEPTIVE PILLS: ICD-10-CM

## 2023-12-15 DIAGNOSIS — Z01.419 WELL WOMAN EXAM WITH ROUTINE GYNECOLOGICAL EXAM: Primary | ICD-10-CM

## 2023-12-15 DIAGNOSIS — Z12.4 ENCOUNTER FOR SCREENING FOR CERVICAL CANCER: ICD-10-CM

## 2023-12-15 PROCEDURE — G0123 SCREEN CERV/VAG THIN LAYER: HCPCS

## 2023-12-15 NOTE — PROGRESS NOTES
"Muriel Avina is a 33 y.o. female    History of Present Illness  The patient is here today for her annual well woman exam. The patient denies any GYN problems or concerns. She is currently taking Ilene for contraception and reports cycles are regular. She wishes to continue the same. She does have refills present at her pharmacy for one year.   Gynecologic Exam  The patient's pertinent negatives include no genital itching, genital lesions, genital odor, genital rash, missed menses, pelvic pain, vaginal bleeding or vaginal discharge. The patient is experiencing no pain. She is not pregnant. Pertinent negatives include no abdominal pain, anorexia, back pain, chills, constipation, diarrhea, discolored urine, dysuria, fever, flank pain, frequency, headaches, hematuria, joint pain, joint swelling, nausea, painful intercourse, rash, sore throat, urgency or vomiting. Nothing aggravates the symptoms. She has tried nothing for the symptoms. She is sexually active. She uses oral contraceptives for contraception. Her menstrual history has been regular. Her past medical history is significant for a  section.     /74 (BP Location: Left arm, Patient Position: Sitting, Cuff Size: Adult)   Ht 160 cm (63\")   Wt 83.9 kg (185 lb)   LMP 2023   Breastfeeding Yes   BMI 32.77 kg/m²     Outpatient Encounter Medications as of 12/15/2023   Medication Sig Dispense Refill    drospirenone-ethinyl estradiol (ILENE) 3-0.02 MG per tablet Take 1 tablet by mouth Daily. 28 tablet 12    Prenatal-FE Bis-FA-DHA w/o A (COMPLETE PRENATAL/DHA PO) Take 1 tablet by mouth Daily.       No facility-administered encounter medications on file as of 12/15/2023.       Past Medical History  Past Medical History:   Diagnosis Date    Anemia     Anxiety     Depression     Gallstone     Gestational hypertension 2011    Migraine     PMS (premenstrual syndrome)     Preeclampsia      labor     Tremor     Varicella  "       Surgical History  Past Surgical History:   Procedure Laterality Date     SECTION Bilateral 2023    Procedure:  SECTION PRIMARY;  Surgeon: Jesus Perez MD;  Location: [facility] LABOR DELIVERY;  Service: Obstetrics/Gynecology;  Laterality: Bilateral;    CHOLECYSTECTOMY WITH INTRAOPERATIVE CHOLANGIOGRAM N/A 10/01/2020    Procedure: LAPAROSCOPIC CHOLECYSTECTOMY;  Surgeon: Heather Glass MD;  Location: [facility] OR;  Service: General;  Laterality: N/A;    WISDOM TOOTH EXTRACTION         Family History  Family History   Problem Relation Age of Onset    Stroke Paternal Grandfather     Breast cancer Paternal Grandmother     Hyperlipidemia Maternal Grandmother     Aortic aneurysm Maternal Grandfather        The following portions of the patient's history were reviewed and updated as appropriate: allergies, current medications, past family history, past medical history, past social history, past surgical history, and problem list.    Review of Systems   Constitutional: Negative.  Negative for chills and fever.   HENT: Negative.  Negative for sore throat.    Eyes: Negative.    Respiratory: Negative.     Cardiovascular: Negative.    Gastrointestinal: Negative.  Negative for abdominal pain, anorexia, constipation, diarrhea, nausea and vomiting.   Endocrine: Negative.    Genitourinary: Negative.  Negative for breast discharge, breast lump, breast pain, dysuria, flank pain, frequency, hematuria, missed menses, pelvic pain, urgency and vaginal discharge.   Musculoskeletal: Negative.  Negative for back pain and joint pain.   Skin: Negative.  Negative for rash.   Allergic/Immunologic: Negative.    Neurological: Negative.    Hematological: Negative.    Psychiatric/Behavioral: Negative.         Objective   Physical Exam  Vitals and nursing note reviewed. Exam conducted with a chaperone present.   Constitutional:       General: She is not in acute distress.     Appearance: She is well-developed. She is  not diaphoretic.   HENT:      Head: Normocephalic.      Right Ear: External ear normal.      Left Ear: External ear normal.      Nose: Nose normal.   Eyes:      General: No scleral icterus.        Right eye: No discharge.         Left eye: No discharge.      Conjunctiva/sclera: Conjunctivae normal.      Pupils: Pupils are equal, round, and reactive to light.   Neck:      Thyroid: No thyromegaly.      Vascular: No carotid bruit.      Trachea: No tracheal deviation.   Cardiovascular:      Rate and Rhythm: Normal rate and regular rhythm.      Pulses: Normal pulses.      Heart sounds: Normal heart sounds. No murmur heard.  Pulmonary:      Effort: Pulmonary effort is normal. No respiratory distress.      Breath sounds: Normal breath sounds. No wheezing.   Chest:   Breasts:     Breasts are symmetrical.      Right: Normal. No swelling, bleeding, inverted nipple, mass, nipple discharge, skin change or tenderness.      Left: Normal. No swelling, bleeding, inverted nipple, mass, nipple discharge, skin change or tenderness.   Abdominal:      General: Abdomen is flat. Bowel sounds are normal. There is no distension.      Palpations: Abdomen is soft. There is no mass.      Tenderness: There is no abdominal tenderness. There is no right CVA tenderness, left CVA tenderness or guarding.      Hernia: No hernia is present. There is no hernia in the left inguinal area or right inguinal area.   Genitourinary:     General: Normal vulva.      Exam position: Lithotomy position.      Labia:         Right: No rash, tenderness, lesion or injury.         Left: No rash, tenderness, lesion or injury.       Vagina: Normal. No signs of injury and foreign body. Bleeding present. No vaginal discharge, erythema or tenderness.      Cervix: Normal.      Uterus: Normal. Not enlarged, not fixed and not tender.       Adnexa: Right adnexa normal and left adnexa normal.        Right: No mass, tenderness or fullness.          Left: No mass, tenderness or  fullness.        Rectum: Normal. No mass.      Comments: Patient is currently on her menstrual cycle    BSU normal  Urethral meatus  Normal  Perineum  Normal  Musculoskeletal:         General: No tenderness. Normal range of motion.      Cervical back: Normal range of motion and neck supple.   Lymphadenopathy:      Head:      Right side of head: No submental, submandibular, tonsillar, preauricular, posterior auricular or occipital adenopathy.      Left side of head: No submental, submandibular, tonsillar, preauricular, posterior auricular or occipital adenopathy.      Cervical: No cervical adenopathy.      Right cervical: No superficial, deep or posterior cervical adenopathy.     Left cervical: No superficial, deep or posterior cervical adenopathy.      Upper Body:      Right upper body: No supraclavicular, axillary or pectoral adenopathy.      Left upper body: No supraclavicular, axillary or pectoral adenopathy.      Lower Body: No right inguinal adenopathy. No left inguinal adenopathy.   Skin:     General: Skin is warm and dry.      Findings: No bruising, erythema or rash.   Neurological:      Mental Status: She is alert and oriented to person, place, and time.      Coordination: Coordination normal.   Psychiatric:         Mood and Affect: Mood normal.         Behavior: Behavior normal.         Thought Content: Thought content normal.         Judgment: Judgment normal.       PHQ-9 Depression Screening  Little interest or pleasure in doing things? 0-->not at all   Feeling down, depressed, or hopeless? 0-->not at all   Trouble falling or staying asleep, or sleeping too much?     Feeling tired or having little energy?     Poor appetite or overeating?     Feeling bad about yourself - or that you are a failure or have let yourself or your family down?     Trouble concentrating on things, such as reading the newspaper or watching television?     Moving or speaking so slowly that other people could have noticed? Or the  opposite - being so fidgety or restless that you have been moving around a lot more than usual?     Thoughts that you would be better off dead, or of hurting yourself in some way?     PHQ-9 Total Score 0   If you checked off any problems, how difficult have these problems made it for you to do your work, take care of things at home, or get along with other people?          Assessment & Plan   Diagnoses and all orders for this visit:    1. Well woman exam with routine gynecological exam (Primary)  Comments:  The patient is here today for her annual well woman exam. The patient denies any GYN problems or concerns at this time.    2. Encounter for screening for cervical cancer  Comments:  The patient's last pap smear on 2/8/2022 was ASCUS with positive HPV. The patient will have a cervical cancer screening pap smear completed today. I did discussed with the current ASCCP guidelines with the patient and her previous pap smear history.   Orders:  -     Liquid-based Pap Smear, Screening    3. Breastfeeding (infant)  Comments:  The patient is 5 months postpartum and is still breastfeeding her infant. She denies any problems or concerns at this time and has maintained a good supply.    4. Encounter for surveillance of contraceptive pills  Comments:  The patient is currently taking Jocelynn and reports regular cycles. The patient wishes to continue. The patient currently has refills for one year at her pharmacy.      Normal GYN exam. Encouraged SBE, pt is aware how to do self breast exam and the importance of same. Discussed weight management and importance of maintaining a healthy weight. Discussed Vitamin D intake and the importance of adequate vitamin D for both bone health and a healthy immune system.  Discussed daily exercise and the importance of same, in regards to a healthy heart as well as helping to maintain her weight and improving her mental health.  Colonoscopy is not indicated. Mammogram is not indicated. Bone  density is not indicated. Pap smear is done per ASCCP guidelines. HPV is done. Lab work up is up to date.             Kelsey Bill, APRN  12/15/2023

## 2023-12-20 LAB
GEN CATEG CVX/VAG CYTO-IMP: NORMAL
LAB AP CASE REPORT: NORMAL
LAB AP GYN ADDITIONAL INFORMATION: NORMAL
LAB AP GYN OTHER FINDINGS: NORMAL
Lab: NORMAL
PATH INTERP SPEC-IMP: NORMAL
STAT OF ADQ CVX/VAG CYTO-IMP: NORMAL

## 2024-02-29 ENCOUNTER — HOSPITAL ENCOUNTER (OUTPATIENT)
Dept: GENERAL RADIOLOGY | Facility: HOSPITAL | Age: 34
Discharge: HOME | End: 2024-02-29
Admitting: NURSE PRACTITIONER
Payer: MEDICAID

## 2024-02-29 ENCOUNTER — TRANSCRIBE ORDERS (OUTPATIENT)
Dept: ADMINISTRATIVE | Facility: HOSPITAL | Age: 34
End: 2024-02-29
Payer: MEDICAID

## 2024-02-29 DIAGNOSIS — Z00.00 ANNUAL PHYSICAL EXAM: Primary | ICD-10-CM

## 2024-02-29 DIAGNOSIS — Z00.00 ANNUAL PHYSICAL EXAM: ICD-10-CM

## 2024-02-29 PROCEDURE — 72040 X-RAY EXAM NECK SPINE 2-3 VW: CPT

## 2024-09-20 DIAGNOSIS — Z30.011 ENCOUNTER FOR INITIAL PRESCRIPTION OF CONTRACEPTIVE PILLS: ICD-10-CM

## 2024-09-20 RX ORDER — DROSPIRENONE AND ETHINYL ESTRADIOL 0.02-3(28)
1 KIT ORAL DAILY
Qty: 28 TABLET | Refills: 3 | Status: SHIPPED | OUTPATIENT
Start: 2024-09-20

## 2025-01-16 ENCOUNTER — OFFICE VISIT (OUTPATIENT)
Dept: OBSTETRICS AND GYNECOLOGY | Age: 35
End: 2025-01-16
Payer: COMMERCIAL

## 2025-01-16 VITALS
BODY MASS INDEX: 32.78 KG/M2 | WEIGHT: 185 LBS | HEIGHT: 63 IN | DIASTOLIC BLOOD PRESSURE: 85 MMHG | SYSTOLIC BLOOD PRESSURE: 136 MMHG

## 2025-01-16 DIAGNOSIS — Z01.419 WELL WOMAN EXAM WITH ROUTINE GYNECOLOGICAL EXAM: Primary | ICD-10-CM

## 2025-01-16 DIAGNOSIS — F32.9 REACTIVE DEPRESSION: ICD-10-CM

## 2025-01-16 DIAGNOSIS — Z12.31 ENCOUNTER FOR SCREENING MAMMOGRAM FOR BREAST CANCER: ICD-10-CM

## 2025-01-16 RX ORDER — BUPROPION HYDROCHLORIDE 100 MG/1
100 TABLET, EXTENDED RELEASE ORAL DAILY
Qty: 30 TABLET | Refills: 0 | Status: SHIPPED | OUTPATIENT
Start: 2025-01-16

## 2025-01-16 RX ORDER — MULTIPLE VITAMINS W/ MINERALS TAB 9MG-400MCG
1 TAB ORAL DAILY
COMMUNITY

## 2025-01-16 NOTE — PROGRESS NOTES
"Subjective     Dania Avina is a 34 y.o. female    History of Present Illness  The patient presents to the office today for her WWE. She has now been off of her ocp for 2 months as her  has had vasectomy. Her family recently suffered the loss of a close family member a month ago. She verbalizes that she is sleeping more, with fatigue, and poor appetite. Today, she denies having any GYN concerns. She denies vaginal discharge, odor, pelvic pain, or urinary symptoms.   Gynecologic Exam  The patient's pertinent negatives include no genital itching, genital lesions, genital odor, genital rash, missed menses, pelvic pain, vaginal bleeding or vaginal discharge. The patient is experiencing no pain. Pertinent negatives include no abdominal pain, anorexia, back pain, chills, constipation, diarrhea, discolored urine, dysuria, fever, flank pain, frequency, headaches, hematuria, joint pain, joint swelling, nausea, painful intercourse, rash, sore throat, urgency or vomiting. She is sexually active. No, her partner does not have an STD. She uses vasectomy for contraception. Her menstrual history has been regular. The maximum temperature recorded prior to her arrival was no fever.         /85   Ht 160 cm (63\")   Wt 83.9 kg (185 lb)   LMP 12/25/2024 (Approximate)   Breastfeeding No   BMI 32.77 kg/m²     Outpatient Encounter Medications as of 1/16/2025   Medication Sig Dispense Refill    multivitamin with minerals tablet tablet Take 1 tablet by mouth Daily.      buPROPion SR (Wellbutrin SR) 100 MG 12 hr tablet Take 1 tablet by mouth Daily. 30 tablet 0    [DISCONTINUED] drospirenone-ethinyl estradiol (ILENE,GIANVI) 3-0.02 MG per tablet TAKE 1 TABLET BY MOUTH DAILY 28 tablet 3    [DISCONTINUED] Prenatal-FE Bis-FA-DHA w/o A (COMPLETE PRENATAL/DHA PO) Take 1 tablet by mouth Daily.       No facility-administered encounter medications on file as of 1/16/2025.       Past Medical History  Past Medical History:   Diagnosis Date "    Anemia     Anxiety     Depression     Gallstone     Gestational hypertension 2011    Migraine     PMS (premenstrual syndrome)     Preeclampsia      labor     Tremor     Varicella        Surgical History  Past Surgical History:   Procedure Laterality Date     SECTION Bilateral 2023    Procedure:  SECTION PRIMARY;  Surgeon: Jesus Perez MD;  Location: [facility] LABOR DELIVERY;  Service: Obstetrics/Gynecology;  Laterality: Bilateral;    CHOLECYSTECTOMY WITH INTRAOPERATIVE CHOLANGIOGRAM N/A 10/01/2020    Procedure: LAPAROSCOPIC CHOLECYSTECTOMY;  Surgeon: Heather Glass MD;  Location: [facility] OR;  Service: General;  Laterality: N/A;    WISDOM TOOTH EXTRACTION         Family History  Family History   Problem Relation Age of Onset    Stroke Paternal Grandfather     Breast cancer Paternal Grandmother     Hyperlipidemia Maternal Grandmother     Aortic aneurysm Maternal Grandfather     Ovarian cancer Neg Hx     Uterine cancer Neg Hx     Colon cancer Neg Hx     Melanoma Neg Hx     Prostate cancer Neg Hx        The following portions of the patient's history were reviewed and updated as appropriate: allergies, current medications, past family history, past medical history, past social history, past surgical history, and problem list.    Review of Systems   Constitutional:  Positive for appetite change and fatigue. Negative for chills and fever.   HENT: Negative.  Negative for sore throat.    Eyes: Negative.    Respiratory: Negative.     Cardiovascular: Negative.    Gastrointestinal: Negative.  Negative for abdominal pain, anorexia, constipation, diarrhea, nausea and vomiting.   Endocrine: Negative.    Genitourinary: Negative.  Negative for breast discharge, breast lump, breast pain, dysuria, flank pain, frequency, hematuria, missed menses, pelvic pain, urgency and vaginal discharge.   Musculoskeletal: Negative.  Negative for back pain and joint pain.   Skin: Negative.  Negative for  rash.   Allergic/Immunologic: Negative.    Neurological:  Positive for headache.   Hematological: Negative.    Psychiatric/Behavioral: Negative.  Positive for depressed mood. Negative for self-injury and suicidal ideas.        Objective   Physical Exam  Vitals and nursing note reviewed. Exam conducted with a chaperone present.   Constitutional:       General: She is not in acute distress.     Appearance: She is well-developed. She is obese. She is not diaphoretic.   HENT:      Head: Normocephalic.      Right Ear: External ear normal.      Left Ear: External ear normal.      Nose: Nose normal.   Eyes:      General: No scleral icterus.        Right eye: No discharge.         Left eye: No discharge.      Conjunctiva/sclera: Conjunctivae normal.      Pupils: Pupils are equal, round, and reactive to light.   Neck:      Thyroid: No thyromegaly.      Vascular: No carotid bruit.      Trachea: No tracheal deviation.   Cardiovascular:      Rate and Rhythm: Normal rate and regular rhythm.      Pulses: Normal pulses.      Heart sounds: Normal heart sounds. No murmur heard.  Pulmonary:      Effort: Pulmonary effort is normal. No respiratory distress.      Breath sounds: Normal breath sounds. No wheezing.   Chest:   Breasts:     Breasts are symmetrical.      Right: Normal. No swelling, bleeding, inverted nipple, mass, nipple discharge, skin change or tenderness.      Left: Normal. No swelling, bleeding, inverted nipple, mass, nipple discharge, skin change or tenderness.   Abdominal:      General: Bowel sounds are normal. There is no distension.      Palpations: Abdomen is soft. There is no mass.      Tenderness: There is no abdominal tenderness. There is no right CVA tenderness, left CVA tenderness or guarding.      Hernia: No hernia is present. There is no hernia in the left inguinal area or right inguinal area.   Genitourinary:     General: Normal vulva.      Exam position: Lithotomy position.      Labia:         Right: No  rash, tenderness, lesion or injury.         Left: No rash, tenderness, lesion or injury.       Vagina: Normal. No signs of injury and foreign body. No vaginal discharge, erythema, tenderness or bleeding.      Rectum: Normal. No mass.      Comments:   BSU normal  Urethral meatus  Normal  Perineum  Normal  Patient declines internal pelvic exam, verbal permission for external exam only.  Musculoskeletal:         General: No tenderness. Normal range of motion.      Cervical back: Normal range of motion and neck supple.   Lymphadenopathy:      Head:      Right side of head: No submental, submandibular, tonsillar, preauricular, posterior auricular or occipital adenopathy.      Left side of head: No submental, submandibular, tonsillar, preauricular, posterior auricular or occipital adenopathy.      Cervical: No cervical adenopathy.      Right cervical: No superficial, deep or posterior cervical adenopathy.     Left cervical: No superficial, deep or posterior cervical adenopathy.      Upper Body:      Right upper body: No supraclavicular, axillary or pectoral adenopathy.      Left upper body: No supraclavicular, axillary or pectoral adenopathy.      Lower Body: No right inguinal adenopathy. No left inguinal adenopathy.   Skin:     General: Skin is warm and dry.      Findings: No bruising, erythema or rash.   Neurological:      Mental Status: She is alert and oriented to person, place, and time.      Coordination: Coordination normal.   Psychiatric:         Mood and Affect: Mood normal.         Behavior: Behavior normal.         Thought Content: Thought content normal.         Judgment: Judgment normal.       PHQ-9 Depression Screening  Little interest or pleasure in doing things? Over half   Feeling down, depressed, or hopeless? Almost all   PHQ-2 Total Score 5   Trouble falling or staying asleep, or sleeping too much? Almost all   Feeling tired or having little energy? Almost all   Poor appetite or overeating? Almost all    Feeling bad about yourself - or that you are a failure or have let yourself or your family down? Over half   Trouble concentrating on things, such as reading the newspaper or watching television? Almost all   Moving or speaking so slowly that other people could have noticed? Or the opposite - being so fidgety or restless that you have been moving around a lot more than usual? Almost all   Thoughts that you would be better off dead, or of hurting yourself in some way? Not at all   PHQ-9 Total Score 22   If you checked off any problems, how difficult have these problems made it for you to do your work, take care of things at home, or get along with other people? Very difficult        Assessment & Plan   Diagnoses and all orders for this visit:    1. Well woman exam with routine gynecological exam (Primary)  Comments:  The patient presents to the office today for her WWE. She is established with her pcp. She has an upcoming yearly with pcp and screening labs will be completed then. The patient's last pap smear was completed on 12/15/2023 with normal/negative results. She does not wish to have screening pap smear or internal pelvic examination completed today, only external visual exam. Breast exam in office today normal. She denies having any GYN concerns to address today.    2. Encounter for screening mammogram for breast cancer  Comments:  Patient will be contacted to schedule first baseline mammogram at the [facility].  Orders:  -     Mammo Screening Digital Tomosynthesis Bilateral With CAD; Future    3. Reactive depression  Comments:  Patient reports she has been off of her ocp for 2 months as her  has had a recent vasectomy with recheck. Additionally, her family recently lost her mother-in-law a month ago. Her PHQ-2 score today in office is 22. She denies SI/HI. She is expressive to her  daily of how she feels and if she is having a good/bad day. She has been treated for depression as early as in her  "teens with use of Zoloft and Lexapro, which made her feel \"numb\". She does not wish to try those again. She is willing to try medication, but wishes to speak to her  first. Discussed with patient that if she starts new medication that if can take 4-6 weeks to achieve full efficacy. Medication s/e counseling completed in office. She does not wish to make a 4 week follow up appointment at this time as recommended, instead, if she starts medication she will contact the office to schedule.   Orders:  -     buPROPion SR (Wellbutrin SR) 100 MG 12 hr tablet; Take 1 tablet by mouth Daily.  Dispense: 30 tablet; Refill: 0         Normal GYN exam. Encouraged SBE, pt is aware how to do self breast exam and the importance of same. Discussed weight management and importance of maintaining a healthy weight. Discussed Vitamin D intake and the importance of adequate vitamin D for both bone health and a healthy immune system.  Discussed daily exercise and the importance of same, in regards to a healthy heart as well as helping to maintain her weight and improving her mental health.  Colonoscopy is not indicated. Mammogram will be scheduled. Bone density is not indicated. Pap smear is not done per ASCCP guidelines. HPV is not done. Lab work up is up to date.      BMI is >= 30 and <35. (Class 1 Obesity). The following options were offered after discussion;: information on healthy weight added to patient's after visit summary       Kelsey Bill, APRN  1/16/2025  "

## 2025-01-31 LAB
NCCN CRITERIA FLAG: NORMAL
TYRER CUZICK SCORE: 12.9

## 2025-02-05 ENCOUNTER — HOSPITAL ENCOUNTER (OUTPATIENT)
Dept: MAMMOGRAPHY | Facility: HOSPITAL | Age: 35
Discharge: HOME | End: 2025-02-05
Payer: COMMERCIAL

## 2025-02-05 DIAGNOSIS — Z12.31 ENCOUNTER FOR SCREENING MAMMOGRAM FOR BREAST CANCER: ICD-10-CM

## 2025-02-05 PROCEDURE — 77067 SCR MAMMO BI INCL CAD: CPT

## 2025-02-05 PROCEDURE — 77063 BREAST TOMOSYNTHESIS BI: CPT

## (undated) DEVICE — CLIP APPLIER: Brand: ENDO CLIP

## (undated) DEVICE — SUT GUT PLAIN TPR PT 2/0 27IN 53T

## (undated) DEVICE — VAGINAL PREP TRAY: Brand: MEDLINE INDUSTRIES, INC.

## (undated) DEVICE — TBG PENCL TELESCP MEGADYNE SMOKE EVAC 10FT

## (undated) DEVICE — ST TBG PNEUMOCLEAR EVAC SMOKE HIFLO

## (undated) DEVICE — SUT MNCRYL 0/0 CTX 36IN Y398H

## (undated) DEVICE — Device

## (undated) DEVICE — KENDALL SCD EXPRESS SLEEVES, KNEE LENGTH, LARGE: Brand: KENDALL SCD

## (undated) DEVICE — PAD LAP CHOLE: Brand: MEDLINE INDUSTRIES, INC.

## (undated) DEVICE — 3M™ MEDIPORE™ H SOFT CLOTH SURGICAL TAPE 2868, 8 INCH X 10 YARD (20,3CM X 9,1M), 6 ROLLS/CASE: Brand: 3M™ MEDIPORE™

## (undated) DEVICE — LARGE, DISPOSABLE C-SECTION RETRACTOR: Brand: ALEXIS ® O C-SECTION PROTECTOR/RETRACTOR

## (undated) DEVICE — 3M™ STERI-STRIP™ BLEND TONE SKIN CLOSURES, B1557, TAN, 1/2 IN X 4 IN (12MM X 100MM), 6 STRIPS/ENVELOPE: Brand: 3M™ STERI-STRIP™

## (undated) DEVICE — ELECTRD BLD EZ CLN MOD XLNG 2.75IN

## (undated) DEVICE — 3M™ STERI-STRIP™ REINFORCED ADHESIVE SKIN CLOSURES, R1547, 1/2 IN X 4 IN (12 MM X 100 MM), 6 STRIPS/ENVELOPE: Brand: 3M™ STERI-STRIP™

## (undated) DEVICE — ENDOPATH PNEUMONEEDLE INSUFFLATION NEEDLES WITH LUER LOCK CONNECTORS 120MM: Brand: ENDOPATH

## (undated) DEVICE — APPL CHLORAPREP HI/LITE 26ML ORNG

## (undated) DEVICE — TOWEL,OR,DSP,ST,BLUE,STD,4/PK,20PK/CS: Brand: MEDLINE

## (undated) DEVICE — GOWN,PREVENTION PLUS,XLARGE,STERILE: Brand: MEDLINE

## (undated) DEVICE — CVR HNDL LIGHT RIGID

## (undated) DEVICE — PATIENT RETURN ELECTRODE, SINGLE-USE, CONTACT QUALITY MONITORING, ADULT, WITH 15 FT (4.5 M) CORD. FOR PATIENTS WEIGHING OVER 33LBS. (15KG): Brand: MEGADYNE

## (undated) DEVICE — ENDOPATH XCEL DILATING TIP TROCARS WITH STABILITY SLEEVES: Brand: ENDOPATH XCEL

## (undated) DEVICE — PK TURNOVER RM ADV

## (undated) DEVICE — ADHS LIQ MASTISOL 2/3ML

## (undated) DEVICE — ENDOPOUCH RETRIEVER SPECIMEN RETRIEVAL BAGS: Brand: ENDOPOUCH RETRIEVER

## (undated) DEVICE — ANTIBACTERIAL UNDYED BRAIDED (POLYGLACTIN 910), SYNTHETIC ABSORBABLE SUTURE: Brand: COATED VICRYL

## (undated) DEVICE — ENDOPATH XCEL WITH OPTIVIEW TECHNOLOGY DILATING TIP TROCARS WITH STABILITY SLEEVES: Brand: ENDOPATH XCEL OPTIVIEW

## (undated) DEVICE — PAD C-SECTION: Brand: MEDLINE INDUSTRIES, INC.

## (undated) DEVICE — SUT VIC RAPD SZ 2/0 36IN CT1 VR945 BX/12

## (undated) DEVICE — SUT VIC 0 CTX 36IN J978H

## (undated) DEVICE — SPNG GZ WOVN 4X4IN 12PLY 10/BX STRL

## (undated) DEVICE — ELECTRD L HK EZ CLN 33CM

## (undated) DEVICE — GLV SURG SENSICARE SLT PF LF 8 STRL

## (undated) DEVICE — GLV SURG BIOGEL LTX PF 6 1/2

## (undated) DEVICE — ENDOPATH XCEL WITH OPTIVIEW TECHNOLOGY UNIVERSAL TROCAR STABILITY SLEEVES: Brand: ENDOPATH XCEL OPTIVIEW

## (undated) DEVICE — SUT VIC 0 SUTUPAK TIES 18IN J906G

## (undated) DEVICE — MONOPOLAR METZENBAUM SCISSOR, MINI BLADE TIP, DISPOSABLE: Brand: MONOPOLAR METZENBAUM SCISSOR, MINI BLADE TIP, DISPOSABLE

## (undated) DEVICE — 2, DISPOSABLE SUCTION/IRRIGATOR WITHOUT DISPOSABLE TIP: Brand: STRYKEFLOW

## (undated) DEVICE — SUT VIC RAPD SZ 3/0 27IN PS1 VR935